# Patient Record
Sex: MALE | Race: WHITE | NOT HISPANIC OR LATINO | Employment: OTHER | URBAN - METROPOLITAN AREA
[De-identification: names, ages, dates, MRNs, and addresses within clinical notes are randomized per-mention and may not be internally consistent; named-entity substitution may affect disease eponyms.]

---

## 2018-01-22 ENCOUNTER — OFFICE VISIT (OUTPATIENT)
Dept: MEDICAL GROUP | Facility: MEDICAL CENTER | Age: 63
End: 2018-01-22
Payer: COMMERCIAL

## 2018-01-22 VITALS
RESPIRATION RATE: 16 BRPM | HEIGHT: 69 IN | SYSTOLIC BLOOD PRESSURE: 118 MMHG | WEIGHT: 170.6 LBS | TEMPERATURE: 98 F | HEART RATE: 70 BPM | BODY MASS INDEX: 25.27 KG/M2 | DIASTOLIC BLOOD PRESSURE: 84 MMHG | OXYGEN SATURATION: 96 %

## 2018-01-22 DIAGNOSIS — Z00.00 HEALTHCARE MAINTENANCE: ICD-10-CM

## 2018-01-22 DIAGNOSIS — Z85.51 HISTORY OF BLADDER CANCER: ICD-10-CM

## 2018-01-22 DIAGNOSIS — E78.5 DYSLIPIDEMIA: ICD-10-CM

## 2018-01-22 DIAGNOSIS — Z23 NEED FOR VACCINATION: ICD-10-CM

## 2018-01-22 DIAGNOSIS — Z12.11 COLON CANCER SCREENING: ICD-10-CM

## 2018-01-22 PROCEDURE — 99202 OFFICE O/P NEW SF 15 MIN: CPT | Mod: 25 | Performed by: FAMILY MEDICINE

## 2018-01-22 PROCEDURE — 90686 IIV4 VACC NO PRSV 0.5 ML IM: CPT | Performed by: FAMILY MEDICINE

## 2018-01-22 PROCEDURE — 90471 IMMUNIZATION ADMIN: CPT | Performed by: FAMILY MEDICINE

## 2018-01-22 RX ORDER — ATORVASTATIN CALCIUM 10 MG/1
10 TABLET, FILM COATED ORAL DAILY
COMMUNITY
Start: 2017-11-17 | End: 2018-04-23 | Stop reason: SDUPTHER

## 2018-01-22 ASSESSMENT — PATIENT HEALTH QUESTIONNAIRE - PHQ9: CLINICAL INTERPRETATION OF PHQ2 SCORE: 0

## 2018-01-22 ASSESSMENT — PAIN SCALES - GENERAL: PAINLEVEL: NO PAIN

## 2018-01-22 NOTE — ASSESSMENT & PLAN NOTE
Lipids: Records requested  Fasting Glucose: Records requested  Hepatitis C Screen: Records requested  Colonoscopy: Ordered  PHQ2: done 01/22/18 and normal.    Flu vaccine: Ordered  Tdap: Patient states this was given within the past 10 years.  Zoster vaccine: Patient states this was given.

## 2018-01-22 NOTE — PROGRESS NOTES
"[facility]  Progress Note  New Patient    Subjective:   Jareth Sunshine is a 62 y.o. male here today with a chief complaint of high cholesterol. This is a new patient to me. The patient comes in alone.     Healthcare maintenance  Lipids: Records requested  Fasting Glucose: Records requested  Hepatitis C Screen: Records requested  Colonoscopy: Ordered  PHQ2: done 01/22/18 and normal.    Flu vaccine: Ordered  Tdap: Patient states this was given within the past 10 years.  Zoster vaccine: Patient states this was given.      History of bladder cancer  The patient has a history of bladder cancer status post laser surgery and local chemotherapy in 2011. He has an appointment with a local urologist Dr. Murphy within the next 2 weeks.    Dyslipidemia  The patient takes atorvastatin 10 mg daily for his cholesterol. He tolerates this medication well.      No current outpatient prescriptions on file prior to visit.     No current facility-administered medications on file prior to visit.        Past Medical History:   Diagnosis Date   • Cancer (CMS-HCC)     Bladder, s/p treatment   • Hyperlipidemia        Allergies: Patient has no known allergies.    Surgical History:  has a past surgical history that includes other.    Family History: family history includes Arthritis in his mother; Diabetes in his sister; No Known Problems in his father.    Social History:  reports that he has never smoked. He has never used smokeless tobacco. He reports that he does not drink alcohol or use drugs.    ROS:   No CP or SOB.     Objective:     Vitals:    01/22/18 0810   BP: 118/84   Pulse: 70   Resp: 16   Temp: 36.7 °C (98 °F)   SpO2: 96%   Weight: 77.4 kg (170 lb 9.6 oz)   Height: 1.753 m (5' 9\")       Physical Exam:  Constitutional: Alert, no distress.  Skin: Warm, dry, good turgor, no rashes in visible areas.  Respiratory: Unlabored respiratory effort, lungs clear to auscultation, no wheezes, no ronchi.  Cardiovascular: Normal S1, " S2, no murmur, no edema.  Psych: normal affect and mood.        Assessment and Plan:     1. Healthcare maintenance  - see HPI.     2. History of bladder cancer  - f/u urology.     3. Dyslipidemia  - continue atorvastatin 10 mg daily.     4. Colon cancer screening  - REFERRAL TO GASTROENTEROLOGY    5. Need for vaccination  - INFLUENZA VACCINE QUAD INJ >3Y(PF)        Followup: Return in about 3 months (around 4/22/2018), or if symptoms worsen or fail to improve.

## 2018-01-22 NOTE — ASSESSMENT & PLAN NOTE
The patient takes atorvastatin 10 mg daily for his cholesterol. He tolerates this medication well.

## 2018-01-22 NOTE — ASSESSMENT & PLAN NOTE
The patient has a history of bladder cancer status post laser surgery and local chemotherapy in 2011. He has an appointment with a local urologist Dr. Murphy within the next 2 weeks.

## 2018-01-22 NOTE — LETTER
[facility]  Guero Diaz M.D.  [address]  [facility] [address]  Fax: [phone]   Authorization for Release/Disclosure of   Protected Health Information   Name: JARETH SUNSHINE : 1955 SSN: [id number]   Address:  [facility] [address] Phone:    [phone] (home)    I authorize the entity listed below to release/disclose the PHI below to:   [facility]/Guero Diaz M.D. and Guero Diaz M.D.   Provider or Entity Name:  Dr. Oliverio Rich    Address   [place], [place] Phone:      Fax:     Reason for request: continuity of care   Information to be released:    [  ] LAST COLONOSCOPY,  including any PATH REPORT and follow-up  [  ] LAST FIT/COLOGUARD RESULT [  ] LAST DEXA  [  ] LAST MAMMOGRAM  [  ] LAST PAP  [  ] LAST LABS [  ] RETINA EXAM REPORT  [  ] IMMUNIZATION RECORDS  [X  ] Release all info      [  ] Check here and initial the line next to each item to release ALL health information INCLUDING  _____ Care and treatment for drug and / or alcohol abuse  _____ HIV testing, infection status, or AIDS  _____ Genetic Testing    DATES OF SERVICE OR TIME PERIOD TO BE DISCLOSED: _____________  I understand and acknowledge that:  * This Authorization may be revoked at any time by you in writing, except if your health information has already been used or disclosed.  * Your health information that will be used or disclosed as a result of you signing this authorization could be re-disclosed by the recipient. If this occurs, your re-disclosed health information may no longer be protected by State or Federal laws.  * You may refuse to sign this Authorization. Your refusal will not affect your ability to obtain treatment.  * This Authorization becomes effective upon signing and will  on (date) __________.      If no date is indicated, this Authorization will  one (1) year from the signature date.    Name: Jareth Sunshine    Signature:   Date:     1/22/2018       PLEASE FAX REQUESTED RECORDS BACK TO: [phone]

## 2018-04-16 ENCOUNTER — TELEPHONE (OUTPATIENT)
Dept: MEDICAL GROUP | Facility: MEDICAL CENTER | Age: 63
End: 2018-04-16

## 2018-04-16 DIAGNOSIS — Z13.6 SCREENING FOR ISCHEMIC HEART DISEASE: ICD-10-CM

## 2018-04-16 DIAGNOSIS — Z13.1 SCREENING FOR DIABETES MELLITUS: ICD-10-CM

## 2018-04-16 DIAGNOSIS — Z72.89 OTHER PROBLEMS RELATED TO LIFESTYLE: ICD-10-CM

## 2018-04-16 NOTE — TELEPHONE ENCOUNTER
----- Message from Neeru Elizalde sent at 4/16/2018 11:36 AM PDT -----  Regarding: Lab Request  Patient called to request labs to be ordered

## 2018-04-23 ENCOUNTER — OFFICE VISIT (OUTPATIENT)
Dept: MEDICAL GROUP | Facility: MEDICAL CENTER | Age: 63
End: 2018-04-23
Payer: COMMERCIAL

## 2018-04-23 VITALS
OXYGEN SATURATION: 97 % | HEART RATE: 82 BPM | WEIGHT: 176.48 LBS | BODY MASS INDEX: 26.14 KG/M2 | HEIGHT: 69 IN | TEMPERATURE: 97.8 F | SYSTOLIC BLOOD PRESSURE: 110 MMHG | DIASTOLIC BLOOD PRESSURE: 66 MMHG

## 2018-04-23 DIAGNOSIS — E78.5 DYSLIPIDEMIA: ICD-10-CM

## 2018-04-23 DIAGNOSIS — Z13.6 SCREENING FOR ISCHEMIC HEART DISEASE: ICD-10-CM

## 2018-04-23 DIAGNOSIS — Z23 NEED FOR VACCINATION: ICD-10-CM

## 2018-04-23 DIAGNOSIS — Z12.5 SCREENING FOR PROSTATE CANCER: ICD-10-CM

## 2018-04-23 DIAGNOSIS — Z72.89 OTHER PROBLEMS RELATED TO LIFESTYLE: ICD-10-CM

## 2018-04-23 DIAGNOSIS — C67.9 MALIGNANT NEOPLASM OF URINARY BLADDER, UNSPECIFIED SITE (HCC): ICD-10-CM

## 2018-04-23 DIAGNOSIS — Z13.1 SCREENING FOR DIABETES MELLITUS: ICD-10-CM

## 2018-04-23 DIAGNOSIS — Z00.00 HEALTHCARE MAINTENANCE: ICD-10-CM

## 2018-04-23 PROCEDURE — 90670 PCV13 VACCINE IM: CPT | Performed by: FAMILY MEDICINE

## 2018-04-23 PROCEDURE — 99213 OFFICE O/P EST LOW 20 MIN: CPT | Mod: 25 | Performed by: FAMILY MEDICINE

## 2018-04-23 PROCEDURE — 90472 IMMUNIZATION ADMIN EACH ADD: CPT | Performed by: FAMILY MEDICINE

## 2018-04-23 PROCEDURE — 90715 TDAP VACCINE 7 YRS/> IM: CPT | Performed by: FAMILY MEDICINE

## 2018-04-23 PROCEDURE — 90471 IMMUNIZATION ADMIN: CPT | Performed by: FAMILY MEDICINE

## 2018-04-23 RX ORDER — ATORVASTATIN CALCIUM 10 MG/1
10 TABLET, FILM COATED ORAL DAILY
Qty: 90 TAB | Refills: 3 | Status: SHIPPED | OUTPATIENT
Start: 2018-04-23 | End: 2019-04-13 | Stop reason: SDUPTHER

## 2018-04-23 NOTE — ASSESSMENT & PLAN NOTE
The patient takes atorvastatin 10 mg daily for his cholesterol. He tolerates this medication well. 2015 lipid panel was normal. I don't see LFT's.

## 2018-04-23 NOTE — ASSESSMENT & PLAN NOTE
The patient has bladder cancer status post laser surgery and local chemotherapy in 2011. He has an appointment with a local urologist Dr. Murphy and is requesting a PSA before this appointment.

## 2018-04-23 NOTE — ASSESSMENT & PLAN NOTE
Lipids: ordered.  Fasting Glucose: ordered.  Hepatitis C Screen: ordered.  Colonoscopy: appointment upcoming.  PHQ2: done 01/22/18 and normal.    Tdap: given 04/23/18.  Pneumonia Vaccines: Prevnar given 04/23/18.  Shingrix: patient will return for this.

## 2018-04-23 NOTE — PROGRESS NOTES
"[facility]  Progress Note  Established Patient    Subjective:   Jareth Sunshine is a 63 y.o. male here today with a chief complaint of DLD. The patient is alone.     Dyslipidemia  The patient takes atorvastatin 10 mg daily for his cholesterol. He tolerates this medication well. 2015 lipid panel was normal. I don't see LFT's.     Healthcare maintenance  Lipids: ordered.  Fasting Glucose: ordered.  Hepatitis C Screen: ordered.  Colonoscopy: appointment upcoming.  PHQ2: done 01/22/18 and normal.    Tdap: given 04/23/18.  Pneumonia Vaccines: Prevnar given 04/23/18.  Shingrix: patient will return for this.    Bladder cancer (CMS-HCC)  The patient has bladder cancer status post laser surgery and local chemotherapy in 2011. He has an appointment with a local urologist Dr. Murphy and is requesting a PSA before this appointment.      No current outpatient prescriptions on file prior to visit.     No current facility-administered medications on file prior to visit.        Past Medical History:   Diagnosis Date   • Cancer (CMS-[facility])     Bladder, s/p treatment   • Hyperlipidemia        Allergies: Patient has no known allergies.    Surgical History:  has a past surgical history that includes other.    Family History: family history includes Arthritis in his mother; Diabetes in his sister; No Known Problems in his father.    Social History:  reports that he has never smoked. He has never used smokeless tobacco. He reports that he does not drink alcohol or use drugs.    ROS: no CP or SOB.        Objective:     Vitals:    04/23/18 0901   BP: 110/66   Pulse: 82   Temp: 36.6 °C (97.8 °F)   SpO2: 97%   Weight: 80 kg (176 lb 7.7 oz)   Height: 1.753 m (5' 9\")       Physical Exam:  General: alert in no apparent distress.   Cardio: regular rate and rhythm, no murmurs, rubs or gallops.   Resp: CTAB no w/r/r.         Assessment and Plan:     1. Need for vaccination  - TDAP VACCINE =>8YO IM  - Pneumococcal Conjugate Vaccine " 13-Valent <4yo IM    2. Screening for ischemic heart disease  - LIPID PROFILE; Future    3. Screening for diabetes mellitus  - COMP METABOLIC PANEL; Future    4. Other problems related to lifestyle  - HEP C VIRUS ANTIBODY; Future    5. Screening for prostate cancer  - PROSTATE SPECIFIC AG SCREENING; Future    6. Healthcare maintenance  - see HPI.     7. Dyslipidemia  This is an established and stable problem.  - atorvastatin (LIPITOR) 10 MG Tab; Take 1 Tab by mouth every day.  Dispense: 90 Tab; Refill: 3    8. Malignant neoplasm of urinary bladder, unspecified site (CMS-HCC)  This is an established and stable problem.  - f/u urology.         Followup: Return in about 9 months (around 1/23/2019), or if symptoms worsen or fail to improve, for Wellness Visit, Long.

## 2018-05-07 ENCOUNTER — HOSPITAL ENCOUNTER (OUTPATIENT)
Dept: LAB | Facility: MEDICAL CENTER | Age: 63
End: 2018-05-07
Attending: FAMILY MEDICINE
Payer: COMMERCIAL

## 2018-05-07 DIAGNOSIS — Z72.89 OTHER PROBLEMS RELATED TO LIFESTYLE: ICD-10-CM

## 2018-05-07 DIAGNOSIS — Z13.6 SCREENING FOR ISCHEMIC HEART DISEASE: ICD-10-CM

## 2018-05-07 DIAGNOSIS — Z13.1 SCREENING FOR DIABETES MELLITUS: ICD-10-CM

## 2018-05-07 DIAGNOSIS — Z12.5 SCREENING FOR PROSTATE CANCER: ICD-10-CM

## 2018-05-07 LAB
ALBUMIN SERPL BCP-MCNC: 3.7 G/DL (ref 3.2–4.9)
ALBUMIN/GLOB SERPL: 1.2 G/DL
ALP SERPL-CCNC: 54 U/L (ref 30–99)
ALT SERPL-CCNC: 15 U/L (ref 2–50)
ANION GAP SERPL CALC-SCNC: 8 MMOL/L (ref 0–11.9)
AST SERPL-CCNC: 17 U/L (ref 12–45)
BILIRUB SERPL-MCNC: 1.3 MG/DL (ref 0.1–1.5)
BUN SERPL-MCNC: 11 MG/DL (ref 8–22)
CALCIUM SERPL-MCNC: 9.2 MG/DL (ref 8.5–10.5)
CHLORIDE SERPL-SCNC: 105 MMOL/L (ref 96–112)
CHOLEST SERPL-MCNC: 130 MG/DL (ref 100–199)
CO2 SERPL-SCNC: 28 MMOL/L (ref 20–33)
CREAT SERPL-MCNC: 0.99 MG/DL (ref 0.5–1.4)
GLOBULIN SER CALC-MCNC: 3.1 G/DL (ref 1.9–3.5)
GLUCOSE SERPL-MCNC: 93 MG/DL (ref 65–99)
HCV AB SER QL: NEGATIVE
HDLC SERPL-MCNC: 52 MG/DL
LDLC SERPL CALC-MCNC: 59 MG/DL
POTASSIUM SERPL-SCNC: 4.6 MMOL/L (ref 3.6–5.5)
PROT SERPL-MCNC: 6.8 G/DL (ref 6–8.2)
PSA SERPL-MCNC: 0.54 NG/ML (ref 0–4)
SODIUM SERPL-SCNC: 141 MMOL/L (ref 135–145)
TRIGL SERPL-MCNC: 93 MG/DL (ref 0–149)

## 2018-05-07 PROCEDURE — 86803 HEPATITIS C AB TEST: CPT

## 2018-05-07 PROCEDURE — 80061 LIPID PANEL: CPT

## 2018-05-07 PROCEDURE — 80053 COMPREHEN METABOLIC PANEL: CPT

## 2018-05-07 PROCEDURE — 84153 ASSAY OF PSA TOTAL: CPT

## 2018-05-07 PROCEDURE — 36415 COLL VENOUS BLD VENIPUNCTURE: CPT

## 2018-07-10 ENCOUNTER — HOSPITAL ENCOUNTER (EMERGENCY)
Facility: MEDICAL CENTER | Age: 63
End: 2018-07-10
Attending: EMERGENCY MEDICINE
Payer: COMMERCIAL

## 2018-07-10 VITALS
TEMPERATURE: 97.5 F | HEART RATE: 77 BPM | OXYGEN SATURATION: 96 % | HEIGHT: 69 IN | SYSTOLIC BLOOD PRESSURE: 134 MMHG | RESPIRATION RATE: 16 BRPM | BODY MASS INDEX: 26.06 KG/M2 | WEIGHT: 175.93 LBS | DIASTOLIC BLOOD PRESSURE: 104 MMHG

## 2018-07-10 DIAGNOSIS — H10.9 CONJUNCTIVITIS OF RIGHT EYE, UNSPECIFIED CONJUNCTIVITIS TYPE: ICD-10-CM

## 2018-07-10 DIAGNOSIS — H57.8A9 SENSATION OF FOREIGN BODY IN EYE: ICD-10-CM

## 2018-07-10 PROCEDURE — 99284 EMERGENCY DEPT VISIT MOD MDM: CPT

## 2018-07-10 RX ORDER — POLYMYXIN B SULFATE AND TRIMETHOPRIM 1; 10000 MG/ML; [USP'U]/ML
2 SOLUTION OPHTHALMIC EVERY 4 HOURS
Qty: 1 BOTTLE | Refills: 0 | Status: SHIPPED | OUTPATIENT
Start: 2018-07-10 | End: 2019-03-14

## 2018-07-10 RX ORDER — PROPARACAINE HYDROCHLORIDE 5 MG/ML
SOLUTION/ DROPS OPHTHALMIC
Status: DISCONTINUED
Start: 2018-07-10 | End: 2018-07-11 | Stop reason: HOSPADM

## 2018-07-10 ASSESSMENT — PAIN SCALES - GENERAL: PAINLEVEL_OUTOF10: 3

## 2018-07-11 ENCOUNTER — PATIENT OUTREACH (OUTPATIENT)
Dept: HEALTH INFORMATION MANAGEMENT | Facility: OTHER | Age: 63
End: 2018-07-11

## 2018-07-11 NOTE — DISCHARGE INSTRUCTIONS
Allergic Conjunctivitis  A clear membrane (conjunctiva) covers the white part of your eye and the inner surface of your eyelid. Allergic conjunctivitis happens when this membrane has inflammation. This is caused by allergies. Common causes of allergic reactions (allergens)include:  · Outdoor allergens, such as:  ¨ Pollen.  ¨ Grass and weeds.  ¨ Mold spores.  · Indoor allergens, such as:  ¨ Dust.  ¨ Smoke.  ¨ Mold.  ¨ Pet dander.  ¨ Animal hair.  This condition can make your eye red or pink. It can also make your eye feel itchy. This condition cannot be spread from one person to another person (is not contagious).  Follow these instructions at home:  · Try not to be around things that you are allergic to.  · Take or apply over-the-counter and prescription medicines only as told by your doctor. These include any eye drops.  · Place a cool, clean washcloth on your eye for 10-20 minutes. Do this 3-4 times a day.  · Do not touch or rub your eyes.  · Do not wear contact lenses until the inflammation is gone. Wear glasses instead.  · Do not wear eye makeup until the inflammation is gone.  · Keep all follow-up visits as told by your doctor. This is important.  Contact a doctor if:  · Your symptoms get worse.  · Your symptoms do not get better with treatment.  · You have mild eye pain.  · You are sensitive to light,  · You have spots or blisters on your eyes.  · You have pus coming from your eye.  · You have a fever.  Get help right away if:  · You have redness, swelling, or other symptoms in only one eye.  · Your vision is blurry.  · You have vision changes.  · You have very bad eye pain.  Summary  · Allergic conjunctivitis is caused by allergies. It can make your eye red or pink, and it can make your eye feel itchy.  · This condition cannot be spread from one person to another person (is not contagious).  · Try not to be around things that you are allergic to.  · Take or apply over-the-counter and prescription medicines  only as told by your doctor. These include any eye drops.  · Contact your doctor if your symptoms get worse or they do not get better with treatment.  This information is not intended to replace advice given to you by your health care provider. Make sure you discuss any questions you have with your health care provider.  Document Released: 06/07/2011 Document Revised: 08/11/2017 Document Reviewed: 08/11/2017  [employer] Interactive Patient Education © 2017 [employer]  Return if fever, vomiting or if no better in 12 hours.

## 2018-07-11 NOTE — ED PROVIDER NOTES
"ED Provider Note    CHIEF COMPLAINT  Chief Complaint   Patient presents with   • Foreign Body in Eye     Foreign body to right eye when golfing yesterday         HPI  Jareth Sunshine is a 63 y.o. male who presents with history of playing golf yesterday about 2 PM, he felt a foreign body sensation on his right. No other injuries    REVIEW OF SYSTEMS  See HPI for further details.     PAST MEDICAL HISTORY  Past Medical History:   Diagnosis Date   • Cancer (HCC)     Bladder, s/p treatment   • Hyperlipidemia          SOCIAL HISTORY        CURRENT MEDICATIONS  Home Medications    **Home medications have not yet been reviewed for this encounter**         ALLERGIES  No Known Allergies    PHYSICAL EXAM  VITAL SIGNS: /87   Pulse (!) 56   Temp 36.7 °C (98.1 °F)   Resp 16   Ht 1.753 m (5' 9\")   Wt 79.8 kg (175 lb 14.8 oz)   BMI 25.98 kg/m²   Constitutional: Well developed, Well nourished, No acute distress, Non-toxic appearance.   HENT: Normocephalic, Atraumatic, Bilateral external ears normal, Oropharynx moist, No oral exudates, Nose normal.   Eyes: PERRLA, EOMI, Conjunctiva on the left, conjunctiva injected on the right, nasal, superior cornea appears normal anterior chamber, pupil normal lids are normal  Neck: Normal range of motion, No tenderness, Supple, No stridor.   Cardiovascular:  Heart sounds are normal no murmurs or rubs  Thorax & Lungs: Lungs are clear equal breath hands bilaterally no rhonchi rales or wheezes. Chest wall motion is nonlabored  Abdomen: Bowel sounds normal, Soft, No tenderness, No masses, No pulsatile masses.   Skin: Warm, Dry, No erythema, No rash.   Neurologic: Alert & oriented x 3, Normal motor function, Normal sensory function, No focal deficits noted.         COURSE & MEDICAL DECISION MAKING  Pertinent Labs & Imaging studies reviewed. (See chart for details)    . I am not able to find any evidence of foreign body. Lid is everted. Slit-lamp date is no evidence of corneal uptake or " foreign body. He has good relief with topical analgesics. I am seeing no evidence of glaucoma. It I will have him follow up the on-call ophthalmologist, Dr. Ortez think he probably has a conjunctivitis. He is to irrigate right eye, every 2 hours to be followed by Polytrim drops  FINAL IMPRESSION  1.  1. Sensation of foreign body in eye      1. Sensation of foreign body in eye    2. Conjunctivitis of right eye, unspecified conjunctivitis type          2. Conjunctivitis right eye  3.    Disposition:  Discharge instructions are understood. This patient is to return if fever vomiting or no better in 12 hours. Follow up with the on-call ophthalmologist Electronically signed by: Akin Rivas, 7/10/2018 9:11 PM

## 2019-03-14 ENCOUNTER — OFFICE VISIT (OUTPATIENT)
Dept: MEDICAL GROUP | Facility: MEDICAL CENTER | Age: 64
End: 2019-03-14
Payer: COMMERCIAL

## 2019-03-14 VITALS
WEIGHT: 169 LBS | OXYGEN SATURATION: 98 % | RESPIRATION RATE: 16 BRPM | HEART RATE: 69 BPM | BODY MASS INDEX: 25.03 KG/M2 | DIASTOLIC BLOOD PRESSURE: 64 MMHG | TEMPERATURE: 97.2 F | HEIGHT: 69 IN | SYSTOLIC BLOOD PRESSURE: 104 MMHG

## 2019-03-14 DIAGNOSIS — Z12.5 SCREENING FOR PROSTATE CANCER: ICD-10-CM

## 2019-03-14 DIAGNOSIS — C67.9 MALIGNANT NEOPLASM OF URINARY BLADDER, UNSPECIFIED SITE (HCC): ICD-10-CM

## 2019-03-14 DIAGNOSIS — Z00.00 HEALTHCARE MAINTENANCE: ICD-10-CM

## 2019-03-14 DIAGNOSIS — E78.5 DYSLIPIDEMIA: ICD-10-CM

## 2019-03-14 PROCEDURE — 99397 PER PM REEVAL EST PAT 65+ YR: CPT | Performed by: FAMILY MEDICINE

## 2019-03-14 NOTE — ASSESSMENT & PLAN NOTE
Lipids: ordered.  Fasting Glucose: done 2018 and normal.   Hepatitis C Screen: done 2018 and normal.   Colonoscopy: records requested.     Tdap: given 04/23/18.  Pneumonia Vaccines: Prevnar given 04/23/18.  Shingrix: recommended, patient will return for this.

## 2019-03-14 NOTE — ASSESSMENT & PLAN NOTE
The patient has bladder cancer status post laser surgery and local chemotherapy in 2011. He follows with Dr. Murphy (urology).

## 2019-03-14 NOTE — PROGRESS NOTES
"[facility]  Progress Note  Established Patient    Subjective:   Jareth Sunshine is a 63 y.o. male here today for a wellness exam. The patient is alone.     Healthcare maintenance  Lipids: ordered.  Fasting Glucose: done 2018 and normal.   Hepatitis C Screen: done 2018 and normal.   Colonoscopy: records requested.     Tdap: given 04/23/18.  Pneumonia Vaccines: Prevnar given 04/23/18.  Shingrix: recommended, patient will return for this.     Bladder cancer (HCC)  The patient has bladder cancer status post laser surgery and local chemotherapy in 2011. He follows with Dr. Murphy (urology).     Dyslipidemia  The patients cholesterol is under control on atorvastatin.       Current Outpatient Prescriptions on File Prior to Visit   Medication Sig Dispense Refill   • atorvastatin (LIPITOR) 10 MG Tab Take 1 Tab by mouth every day. 90 Tab 3     No current facility-administered medications on file prior to visit.        Past Medical History:   Diagnosis Date   • Cancer (HCC)     Bladder, s/p treatment   • Hyperlipidemia        Allergies: Patient has no known allergies.    Surgical History:  has a past surgical history that includes other.    Family History: family history includes Arthritis in his mother; Diabetes in his sister; No Known Problems in his father.    Social History:  reports that he has never smoked. He has never used smokeless tobacco. He reports that he drinks alcohol. He reports that he does not use drugs.    ROS: no CP or SOB.        Objective:     Vitals:    03/14/19 1302   BP: 104/64   BP Location: Left arm   Patient Position: Sitting   BP Cuff Size: Adult   Pulse: 69   Resp: 16   Temp: 36.2 °C (97.2 °F)   TempSrc: Temporal   SpO2: 98%   Weight: 76.7 kg (169 lb)   Height: 1.753 m (5' 9\")       Physical Exam:  General: alert in no apparent distress.   Cardio: regular rate and rhythm, no murmurs, rubs or gallops.   Resp: CTAB no w/r/r.         Assessment and Plan:     1. Dyslipidemia  - continue " statin.   - Comp Metabolic Panel; Future    2. Malignant neoplasm of urinary bladder, unspecified site (HCC)  - f/u urology.     3. Screening for prostate cancer  - PROSTATE SPECIFIC AG SCREENING; Future    4. Healthcare maintenance  - see HPI.   - discussed diet and exercise, Mediterranean Diet handout given.         Followup: Return in about 1 year (around 3/14/2020), or if symptoms worsen or fail to improve, for Wellness Visit, Long.

## 2019-05-22 ENCOUNTER — HOSPITAL ENCOUNTER (OUTPATIENT)
Dept: LAB | Facility: MEDICAL CENTER | Age: 64
End: 2019-05-22
Attending: UROLOGY
Payer: COMMERCIAL

## 2019-05-22 PROCEDURE — 87086 URINE CULTURE/COLONY COUNT: CPT

## 2019-05-25 LAB
BACTERIA UR CULT: NORMAL
SIGNIFICANT IND 70042: NORMAL
SITE SITE: NORMAL
SOURCE SOURCE: NORMAL

## 2019-05-29 DIAGNOSIS — Z01.810 PRE-OPERATIVE CARDIOVASCULAR EXAMINATION: ICD-10-CM

## 2019-05-29 DIAGNOSIS — Z01.812 PRE-OPERATIVE LABORATORY EXAMINATION: ICD-10-CM

## 2019-05-29 LAB
ANION GAP SERPL CALC-SCNC: 7 MMOL/L (ref 0–11.9)
BASOPHILS # BLD AUTO: 1.2 % (ref 0–1.8)
BASOPHILS # BLD: 0.05 K/UL (ref 0–0.12)
BUN SERPL-MCNC: 12 MG/DL (ref 8–22)
CALCIUM SERPL-MCNC: 9 MG/DL (ref 8.5–10.5)
CHLORIDE SERPL-SCNC: 109 MMOL/L (ref 96–112)
CO2 SERPL-SCNC: 25 MMOL/L (ref 20–33)
CREAT SERPL-MCNC: 1.05 MG/DL (ref 0.5–1.4)
EKG IMPRESSION: NORMAL
EOSINOPHIL # BLD AUTO: 0.05 K/UL (ref 0–0.51)
EOSINOPHIL NFR BLD: 1.2 % (ref 0–6.9)
ERYTHROCYTE [DISTWIDTH] IN BLOOD BY AUTOMATED COUNT: 44.2 FL (ref 35.9–50)
GLUCOSE SERPL-MCNC: 102 MG/DL (ref 65–99)
HCT VFR BLD AUTO: 47.6 % (ref 42–52)
HGB BLD-MCNC: 15.5 G/DL (ref 14–18)
IMM GRANULOCYTES # BLD AUTO: 0.01 K/UL (ref 0–0.11)
IMM GRANULOCYTES NFR BLD AUTO: 0.2 % (ref 0–0.9)
LYMPHOCYTES # BLD AUTO: 1.01 K/UL (ref 1–4.8)
LYMPHOCYTES NFR BLD: 23.3 % (ref 22–41)
MCH RBC QN AUTO: 30.8 PG (ref 27–33)
MCHC RBC AUTO-ENTMCNC: 32.6 G/DL (ref 33.7–35.3)
MCV RBC AUTO: 94.6 FL (ref 81.4–97.8)
MONOCYTES # BLD AUTO: 0.46 K/UL (ref 0–0.85)
MONOCYTES NFR BLD AUTO: 10.6 % (ref 0–13.4)
NEUTROPHILS # BLD AUTO: 2.75 K/UL (ref 1.82–7.42)
NEUTROPHILS NFR BLD: 63.5 % (ref 44–72)
NRBC # BLD AUTO: 0 K/UL
NRBC BLD-RTO: 0 /100 WBC
PLATELET # BLD AUTO: 233 K/UL (ref 164–446)
PMV BLD AUTO: 9.3 FL (ref 9–12.9)
POTASSIUM SERPL-SCNC: 4.3 MMOL/L (ref 3.6–5.5)
RBC # BLD AUTO: 5.03 M/UL (ref 4.7–6.1)
SODIUM SERPL-SCNC: 141 MMOL/L (ref 135–145)
WBC # BLD AUTO: 4.3 K/UL (ref 4.8–10.8)

## 2019-05-29 PROCEDURE — 85025 COMPLETE CBC W/AUTO DIFF WBC: CPT

## 2019-05-29 PROCEDURE — 93005 ELECTROCARDIOGRAM TRACING: CPT

## 2019-05-29 PROCEDURE — 36415 COLL VENOUS BLD VENIPUNCTURE: CPT

## 2019-05-29 PROCEDURE — 80048 BASIC METABOLIC PNL TOTAL CA: CPT

## 2019-05-29 PROCEDURE — 93010 ELECTROCARDIOGRAM REPORT: CPT | Performed by: INTERNAL MEDICINE

## 2019-06-05 ENCOUNTER — ANESTHESIA EVENT (OUTPATIENT)
Dept: SURGERY | Facility: MEDICAL CENTER | Age: 64
End: 2019-06-05
Payer: COMMERCIAL

## 2019-06-06 ENCOUNTER — HOSPITAL ENCOUNTER (OUTPATIENT)
Facility: MEDICAL CENTER | Age: 64
End: 2019-06-06
Attending: UROLOGY | Admitting: UROLOGY
Payer: COMMERCIAL

## 2019-06-06 ENCOUNTER — ANESTHESIA (OUTPATIENT)
Dept: SURGERY | Facility: MEDICAL CENTER | Age: 64
End: 2019-06-06
Payer: COMMERCIAL

## 2019-06-06 VITALS
WEIGHT: 162.7 LBS | DIASTOLIC BLOOD PRESSURE: 77 MMHG | TEMPERATURE: 97.2 F | OXYGEN SATURATION: 99 % | SYSTOLIC BLOOD PRESSURE: 120 MMHG | BODY MASS INDEX: 24.1 KG/M2 | HEIGHT: 69 IN | RESPIRATION RATE: 17 BRPM | HEART RATE: 68 BPM

## 2019-06-06 PROCEDURE — A9270 NON-COVERED ITEM OR SERVICE: HCPCS | Performed by: ANESTHESIOLOGY

## 2019-06-06 PROCEDURE — 160046 HCHG PACU - 1ST 60 MINS PHASE II: Performed by: UROLOGY

## 2019-06-06 PROCEDURE — 700105 HCHG RX REV CODE 258: Performed by: ANESTHESIOLOGY

## 2019-06-06 PROCEDURE — 160048 HCHG OR STATISTICAL LEVEL 1-5: Performed by: UROLOGY

## 2019-06-06 PROCEDURE — 88307 TISSUE EXAM BY PATHOLOGIST: CPT

## 2019-06-06 PROCEDURE — 160009 HCHG ANES TIME/MIN: Performed by: UROLOGY

## 2019-06-06 PROCEDURE — A9270 NON-COVERED ITEM OR SERVICE: HCPCS | Performed by: UROLOGY

## 2019-06-06 PROCEDURE — 700111 HCHG RX REV CODE 636 W/ 250 OVERRIDE (IP): Performed by: ANESTHESIOLOGY

## 2019-06-06 PROCEDURE — 700101 HCHG RX REV CODE 250: Performed by: ANESTHESIOLOGY

## 2019-06-06 PROCEDURE — 160035 HCHG PACU - 1ST 60 MINS PHASE I: Performed by: UROLOGY

## 2019-06-06 PROCEDURE — 160025 RECOVERY II MINUTES (STATS): Performed by: UROLOGY

## 2019-06-06 PROCEDURE — 500879 HCHG PACK, CYSTO: Performed by: UROLOGY

## 2019-06-06 PROCEDURE — 160036 HCHG PACU - EA ADDL 30 MINS PHASE I: Performed by: UROLOGY

## 2019-06-06 PROCEDURE — 700105 HCHG RX REV CODE 258: Performed by: UROLOGY

## 2019-06-06 PROCEDURE — 700102 HCHG RX REV CODE 250 W/ 637 OVERRIDE(OP): Performed by: UROLOGY

## 2019-06-06 PROCEDURE — 501329 HCHG SET, CYSTO IRRIG Y TUR: Performed by: UROLOGY

## 2019-06-06 PROCEDURE — 160028 HCHG SURGERY MINUTES - 1ST 30 MINS LEVEL 3: Performed by: UROLOGY

## 2019-06-06 PROCEDURE — 700102 HCHG RX REV CODE 250 W/ 637 OVERRIDE(OP): Performed by: ANESTHESIOLOGY

## 2019-06-06 PROCEDURE — 160002 HCHG RECOVERY MINUTES (STAT): Performed by: UROLOGY

## 2019-06-06 PROCEDURE — 160039 HCHG SURGERY MINUTES - EA ADDL 1 MIN LEVEL 3: Performed by: UROLOGY

## 2019-06-06 RX ORDER — ONDANSETRON 2 MG/ML
INJECTION INTRAMUSCULAR; INTRAVENOUS PRN
Status: DISCONTINUED | OUTPATIENT
Start: 2019-06-06 | End: 2019-06-06 | Stop reason: SURG

## 2019-06-06 RX ORDER — ONDANSETRON 2 MG/ML
4 INJECTION INTRAMUSCULAR; INTRAVENOUS
Status: DISCONTINUED | OUTPATIENT
Start: 2019-06-06 | End: 2019-06-06 | Stop reason: HOSPADM

## 2019-06-06 RX ORDER — SODIUM CHLORIDE, SODIUM LACTATE, POTASSIUM CHLORIDE, CALCIUM CHLORIDE 600; 310; 30; 20 MG/100ML; MG/100ML; MG/100ML; MG/100ML
INJECTION, SOLUTION INTRAVENOUS CONTINUOUS
Status: DISCONTINUED | OUTPATIENT
Start: 2019-06-06 | End: 2019-06-06 | Stop reason: HOSPADM

## 2019-06-06 RX ORDER — ATORVASTATIN CALCIUM 10 MG/1
10 TABLET, FILM COATED ORAL NIGHTLY
COMMUNITY
End: 2020-07-16

## 2019-06-06 RX ORDER — CEFAZOLIN SODIUM 1 G/3ML
INJECTION, POWDER, FOR SOLUTION INTRAMUSCULAR; INTRAVENOUS PRN
Status: DISCONTINUED | OUTPATIENT
Start: 2019-06-06 | End: 2019-06-06 | Stop reason: SURG

## 2019-06-06 RX ORDER — HALOPERIDOL 5 MG/ML
1 INJECTION INTRAMUSCULAR
Status: DISCONTINUED | OUTPATIENT
Start: 2019-06-06 | End: 2019-06-06 | Stop reason: HOSPADM

## 2019-06-06 RX ORDER — GABAPENTIN 300 MG/1
300 CAPSULE ORAL ONCE
Status: COMPLETED | OUTPATIENT
Start: 2019-06-06 | End: 2019-06-06

## 2019-06-06 RX ORDER — OXYCODONE HCL 5 MG/5 ML
10 SOLUTION, ORAL ORAL
Status: COMPLETED | OUTPATIENT
Start: 2019-06-06 | End: 2019-06-06

## 2019-06-06 RX ORDER — SODIUM CHLORIDE, SODIUM LACTATE, POTASSIUM CHLORIDE, CALCIUM CHLORIDE 600; 310; 30; 20 MG/100ML; MG/100ML; MG/100ML; MG/100ML
INJECTION, SOLUTION INTRAVENOUS
Status: DISCONTINUED | OUTPATIENT
Start: 2019-06-06 | End: 2019-06-06 | Stop reason: SURG

## 2019-06-06 RX ORDER — ACETAMINOPHEN 500 MG
1000 TABLET ORAL ONCE
Status: COMPLETED | OUTPATIENT
Start: 2019-06-06 | End: 2019-06-06

## 2019-06-06 RX ORDER — IPRATROPIUM BROMIDE AND ALBUTEROL SULFATE 2.5; .5 MG/3ML; MG/3ML
3 SOLUTION RESPIRATORY (INHALATION)
Status: DISCONTINUED | OUTPATIENT
Start: 2019-06-06 | End: 2019-06-06 | Stop reason: HOSPADM

## 2019-06-06 RX ORDER — MEPERIDINE HYDROCHLORIDE 25 MG/ML
6.25 INJECTION INTRAMUSCULAR; INTRAVENOUS; SUBCUTANEOUS
Status: DISCONTINUED | OUTPATIENT
Start: 2019-06-06 | End: 2019-06-06 | Stop reason: HOSPADM

## 2019-06-06 RX ORDER — HYDROMORPHONE HYDROCHLORIDE 1 MG/ML
0.1 INJECTION, SOLUTION INTRAMUSCULAR; INTRAVENOUS; SUBCUTANEOUS
Status: DISCONTINUED | OUTPATIENT
Start: 2019-06-06 | End: 2019-06-06 | Stop reason: HOSPADM

## 2019-06-06 RX ORDER — PHENAZOPYRIDINE HYDROCHLORIDE 200 MG/1
200 TABLET, FILM COATED ORAL 3 TIMES DAILY PRN
Qty: 15 TAB | Refills: 0 | Status: SHIPPED | OUTPATIENT
Start: 2019-06-06 | End: 2020-03-19

## 2019-06-06 RX ORDER — OXYCODONE HCL 5 MG/5 ML
5 SOLUTION, ORAL ORAL
Status: COMPLETED | OUTPATIENT
Start: 2019-06-06 | End: 2019-06-06

## 2019-06-06 RX ORDER — DEXAMETHASONE SODIUM PHOSPHATE 4 MG/ML
INJECTION, SOLUTION INTRA-ARTICULAR; INTRALESIONAL; INTRAMUSCULAR; INTRAVENOUS; SOFT TISSUE PRN
Status: DISCONTINUED | OUTPATIENT
Start: 2019-06-06 | End: 2019-06-06 | Stop reason: SURG

## 2019-06-06 RX ORDER — PHENAZOPYRIDINE HYDROCHLORIDE 100 MG/1
200 TABLET, FILM COATED ORAL
Status: COMPLETED | OUTPATIENT
Start: 2019-06-06 | End: 2019-06-06

## 2019-06-06 RX ORDER — HYDROMORPHONE HYDROCHLORIDE 1 MG/ML
0.4 INJECTION, SOLUTION INTRAMUSCULAR; INTRAVENOUS; SUBCUTANEOUS
Status: DISCONTINUED | OUTPATIENT
Start: 2019-06-06 | End: 2019-06-06 | Stop reason: HOSPADM

## 2019-06-06 RX ORDER — HYDROMORPHONE HYDROCHLORIDE 1 MG/ML
0.2 INJECTION, SOLUTION INTRAMUSCULAR; INTRAVENOUS; SUBCUTANEOUS
Status: DISCONTINUED | OUTPATIENT
Start: 2019-06-06 | End: 2019-06-06 | Stop reason: HOSPADM

## 2019-06-06 RX ADMIN — FENTANYL CITRATE 50 MCG: 50 INJECTION, SOLUTION INTRAMUSCULAR; INTRAVENOUS at 16:31

## 2019-06-06 RX ADMIN — CEFAZOLIN 2 G: 330 INJECTION, POWDER, FOR SOLUTION INTRAMUSCULAR; INTRAVENOUS at 16:42

## 2019-06-06 RX ADMIN — ATROPA BELLADONNA AND OPIUM 30 MG: 16.2; 3 SUPPOSITORY RECTAL at 17:56

## 2019-06-06 RX ADMIN — PROPOFOL 170 MG: 10 INJECTION, EMULSION INTRAVENOUS at 16:36

## 2019-06-06 RX ADMIN — DEXAMETHASONE SODIUM PHOSPHATE 8 MG: 4 INJECTION, SOLUTION INTRA-ARTICULAR; INTRALESIONAL; INTRAMUSCULAR; INTRAVENOUS; SOFT TISSUE at 16:43

## 2019-06-06 RX ADMIN — GABAPENTIN 300 MG: 300 CAPSULE ORAL at 15:12

## 2019-06-06 RX ADMIN — LIDOCAINE HYDROCHLORIDE 100 MG: 20 INJECTION, SOLUTION INTRAVENOUS at 16:36

## 2019-06-06 RX ADMIN — EPHEDRINE SULFATE 10 MG: 50 INJECTION INTRAMUSCULAR; INTRAVENOUS; SUBCUTANEOUS at 16:42

## 2019-06-06 RX ADMIN — SODIUM CHLORIDE, POTASSIUM CHLORIDE, SODIUM LACTATE AND CALCIUM CHLORIDE: 600; 310; 30; 20 INJECTION, SOLUTION INTRAVENOUS at 15:10

## 2019-06-06 RX ADMIN — ONDANSETRON 4 MG: 2 INJECTION INTRAMUSCULAR; INTRAVENOUS at 16:46

## 2019-06-06 RX ADMIN — EPHEDRINE SULFATE 5 MG: 50 INJECTION INTRAMUSCULAR; INTRAVENOUS; SUBCUTANEOUS at 16:39

## 2019-06-06 RX ADMIN — ACETAMINOPHEN 1000 MG: 500 TABLET ORAL at 15:13

## 2019-06-06 RX ADMIN — OXYCODONE HYDROCHLORIDE 5 MG: 5 SOLUTION ORAL at 17:39

## 2019-06-06 RX ADMIN — SODIUM CHLORIDE, POTASSIUM CHLORIDE, SODIUM LACTATE AND CALCIUM CHLORIDE: 600; 310; 30; 20 INJECTION, SOLUTION INTRAVENOUS at 16:30

## 2019-06-06 RX ADMIN — PHENAZOPYRIDINE HYDROCHLORIDE 200 MG: 100 TABLET ORAL at 17:39

## 2019-06-06 ASSESSMENT — PAIN SCALES - GENERAL: PAIN_LEVEL: 2

## 2019-06-06 NOTE — OR NURSING
Called OR and clarified UA order. Per Dr. Murphy, no need to collect a UA. Pre admit labs included a urine culture that resulted negative. Will discontinue order.

## 2019-06-06 NOTE — ANESTHESIA POSTPROCEDURE EVALUATION
Patient: Jareth Sunshine    Procedure Summary     Date:  06/06/19 Room / Location:  [place] [address] / SURGERY [facility]    Anesthesia Start:  1631 Anesthesia Stop:      Procedures:       CYSTOSCOPY      TRANS URETHRAL RESECTION BLADDER-  TUMOR Diagnosis:  (HISTORY OF BLADDER CANCER)    Surgeon:  Jim Murphy M.D. Responsible Provider:  Janel Kamara M.D.    Anesthesia Type:  general ASA Status:  2          Final Anesthesia Type: general  Last vitals  BP   Blood Pressure : 128/84, NIBP: (!) 95/55    Temp   36.1 °C (97 °F)    Pulse   Pulse: 61, Heart Rate (Monitored): 61   Resp   (!) 23    SpO2   99 %      Anesthesia Post Evaluation    Patient location during evaluation: PACU  Patient participation: complete - patient participated  Level of consciousness: awake and alert  Pain score: 2    Airway patency: patent  Anesthetic complications: no  Cardiovascular status: hemodynamically stable  Respiratory status: acceptable  Hydration status: euvolemic    PONV: none

## 2019-06-06 NOTE — ANESTHESIA PROCEDURE NOTES
Airway  Date/Time: 6/6/2019 4:37 PM  Performed by: JAMES ROMAN  Authorized by: JAMES ROMAN     Location:  OR  Urgency:  Elective  Difficult Airway: No    Indications for Airway Management:  Anesthesia  Spontaneous Ventilation: absent    Sedation Level:  Deep  Preoxygenated: Yes    Mask Difficulty Assessment:  1 - vent by mask  Final Airway Type:  Supraglottic airway  Final Supraglottic Airway:  Standard LMA  SGA Size:  4  Number of Attempts at Approach:  1

## 2019-06-06 NOTE — ANESTHESIA PREPROCEDURE EVALUATION
Relevant Problems   (+) Bladder cancer (HCC)       Physical Exam    Airway   Mallampati: II  TM distance: >3 FB  Neck ROM: full       Cardiovascular - normal exam  Rhythm: regular  Rate: normal  (-) murmur     Dental - normal exam         Pulmonary - normal exam  Breath sounds clear to auscultation     Abdominal    Neurological - normal exam                 Anesthesia Plan    ASA 2       Plan - general       Airway plan will be LMA        Induction: intravenous    Postoperative Plan: Postoperative administration of opioids is intended.    Pertinent diagnostic labs and testing reviewed    Informed Consent:    Anesthetic plan and risks discussed with patient.    Use of blood products discussed with: patient whom consented to blood products.

## 2019-06-07 LAB — PATHOLOGY CONSULT NOTE: NORMAL

## 2019-06-07 NOTE — OP REPORT
DATE OF SERVICE:  06/06/2019    PREOPERATIVE DIAGNOSIS:  Recurrent bladder cancer.    POSTOPERATIVE DIAGNOSIS:  Recurrent bladder cancer.    PROCEDURE:  Transurethral resection of bladder tumor (small, less than 2 cm).    SURGEON:  Jim Murphy MD    ASSISTANT:  None.    ANESTHESIOLOGIST:  Janel Kamara MD    TYPE OF ANESTHESIA:  General.    INDICATIONS:  Surveillance cystoscopy revealed recurrent tumor at the bladder   neck.    DESCRIPTION OF PROCEDURE:  The patient was identified in the holding area.  He   was taken to the operative suite.  General anesthesia was administered by Dr. Kamara.  He was positioned in the dorsal lithotomy position, sterilely prepped   and draped.  Cefazolin was given intravenously.  A 26-Indonesian continuous flow   resectoscope with visual obturator was advanced.  There was mild narrowing in   the bulbar urethra, which was dilated with the scope.  Prostate was moderately   large.  The lumen of the bladder showed minimal trabeculation and a 1-1.5 cm   tumor at the anterior bladder neck.  To access this, I had to resect the   anterior prostate just at the bladder neck junction.  Then, the tumor was   resected.  TUR chips were collected.  The area of resection was carefully   inspected and coagulated.  No active bleeding was noted at the end of the   procedure.    The bladder was again carefully inspected and no additional tumors were noted.    Having collected the specimens and submitted it for pathologic analysis and   assured that there was no bleeding, the bladder was left partially filled.    The patient was sent to recovery room in stable condition.  He suffered no   intraoperative complications.       ____________________________________     Jim Murphy MD    JAKATELYN / NTS    DD:  06/06/2019 17:12:13  DT:  06/06/2019 18:23:45    D#:  [id number]  Job#:  [id number]

## 2019-06-07 NOTE — ANESTHESIA TIME REPORT
Anesthesia Start and Stop Event Times     Date Time Event    6/6/2019 1631 Anesthesia Start     1716 Anesthesia Stop        Responsible Staff  06/06/19    Name Role Begin End    Janel Kamara M.D. Anesth 1631 1716        Preop Diagnosis (Free Text):  Pre-op Diagnosis     HISTORY OF BLADDER CANCER        Preop Diagnosis (Codes):  Diagnosis Information     Diagnosis Code(s):         Post op Diagnosis  History of bladder cancer      Premium Reason  A. 3PM - 7AM    Comments:

## 2019-06-07 NOTE — OR NURSING
1830: Assumed care of patient from PACU, see flow sheets for vital signs. Patient alert and oriented times four. Assessment completed. Pain is controlled and tolerable for patient. Patient updated of plan of care for discharge. Family/friend at bedside with patient. Discharge instructions reviewed and all questions answered. All needs met, patient dressed and safe to discharge home.   Patient tolerating fluids and food with no nausea.    1839: Patient up to void, Patient ready to go, last vital signs in flow sheet. PIV removed. Patient taken to car in wheelchair. Patient safe to discharge.

## 2019-06-07 NOTE — OR SURGEON
Immediate Post OP Note    PreOp Diagnosis: recurrent bladder cancer    PostOp Diagnosis: same    Procedure(s):  CYSTOSCOPY  TRANS URETHRAL RESECTION BLADDER-  TUMOR    Surgeon(s):  Jim Murphy M.D.    Anesthesiologist/Type of Anesthesia:  Anesthesiologist: Janel Kamara M.D./* No anesthesia type entered *    Surgical Staff:  Circulator: Mike Cruz R.N.    Specimens removed if any:  * No specimens in log *    Estimated Blood Loss: minimal    Findings: tumor at 12 o'clock at BN    Complications: none        6/6/2019 5:09 PM Jim Murphy M.D.

## 2019-06-07 NOTE — ANESTHESIA QCDR
2019 [facility] Clinical Data Registry (for Quality Improvement)     Postoperative nausea/vomiting risk protocol (Adult = 18 yrs and Pediatric 3-17 yrs)- (430 and 463)  General inhalation anesthetic (NOT TIVA) with PONV risk factors: Yes  Provision of anti-emetic therapy with at least 2 different classes of agents: Yes   Patient DID NOT receive anti-emetic therapy and reason is documented in Medical Record:  N/A    Multimodal Pain Management- (AQI59)  Patient undergoing Elective Surgery (i.e. Outpatient, or ASC, or Prescheduled Surgery prior to Hospital Admission): Yes  Use of Multimodal Pain Management, two or more drugs and/or interventions, NOT including systemic opioids: Yes   Exception: Documented allergy to multiple classes of analgesics:  N/A    PACU assessment of acute postoperative pain prior to Anesthesia Care End- Applies to Patients Age = 18- (ABG7)  Initial PACU pain score is which of the following: < 7/10  Patient unable to report pain score: N/A    Post-anesthetic transfer of care checklist/protocol to PACU/ICU- (426 and 427)  Upon conclusion of case, patient transferred to which of the following locations: PACU/Non-ICU  Use of transfer checklist/protocol: Yes  Exclusion: Service Performed in Patient Hospital Room (and thus did not require transfer): N/A    PACU Reintubation- (AQI31)  General anesthesia requiring endotracheal intubation (ETT) along with subsequent extubation in OR or PACU: No  Required reintubation in the PACU: N/A  Extubation was a planned trial documented in the medical record prior to removal of the original airway device: N/A    Unplanned admission to ICU related to anesthesia service up through end of PACU care- (MD51)  Unplanned admission to ICU (not initially anticipated at anesthesia start time): No

## 2019-06-07 NOTE — DISCHARGE INSTRUCTIONS
Cystoscopy  Cystoscopy is a procedure that is used to help diagnose and sometimes treat conditions that affect that lower urinary tract. The lower urinary tract includes the bladder and the tube that drains urine from the bladder out of the body (urethra). Cystoscopy is performed with a thin, tube-shaped instrument with a light and camera at the end (cystoscope). The cystoscope may be hard (rigid) or flexible, depending on the goal of the procedure. The cystoscope is inserted through the urethra, into the bladder.  Cystoscopy may be recommended if you have:  · Urinary tractinfections that keep coming back (recurring).  · Blood in the urine (hematuria).  · Loss of bladder control (urinary incontinence) or an overactive bladder.  · Unusual cells found in a urine sample.  · A blockage in the urethra.  · Painful urination.  · An abnormality in the bladder found during an intravenous pyelogram (IVP) or CT scan.  Cystoscopy may also be done to remove a sample of tissue to be examined under a microscope (biopsy).  Tell a health care provider about:  · Any allergies you have.  · All medicines you are taking, including vitamins, herbs, eye drops, creams, and over-the-counter medicines.  · Any problems you or family members have had with anesthetic medicines.  · Any blood disorders you have.  · Any surgeries you have had.  · Any medical conditions you have.  · Whether you are pregnant or may be pregnant.  What are the risks?  Generally, this is a safe procedure. However, problems may occur, including:  · Infection.  · Bleeding.  · Allergic reactions to medicines.  · Damage to other structures or organs.  What happens before the procedure?  · Ask your health care provider about:  ¨ Changing or stopping your regular medicines. This is especially important if you are taking diabetes medicines or blood thinners.  ¨ Taking medicines such as aspirin and ibuprofen. These medicines can thin your blood. Do not take these medicines  before your procedure if your health care provider instructs you not to.  · Follow instructions from your health care provider about eating or drinking restrictions.  · You may be given antibiotic medicine to help prevent infection.  · You may have an exam or testing, such as X-rays of the bladder, urethra, or kidneys.  · You may have urine tests to check for signs of infection.  · Plan to have someone take you home after the procedure.  What happens during the procedure?  · To reduce your risk of infection, your health care team will wash or sanitize their hands.  · You will be given one or more of the following:  ¨ A medicine to help you relax (sedative).  ¨ A medicine to numb the area (local anesthetic).  · The area around the opening of your urethra will be cleaned.  · The cystoscope will be passed through your urethra into your bladder.  · Germ-free (sterile) fluid will flow through the cystoscope to fill your bladder. The fluid will stretch your bladder so that your surgeon can clearly examine your bladder walls.  · The cystoscope will be removed and your bladder will be emptied.  The procedure may vary among health care providers and hospitals.  What happens after the procedure?  · You may have some soreness or pain in your abdomen and urethra. Medicines will be available to help you.  · You may have some blood in your urine.  · Do not drive for 24 hours if you received a sedative.  This information is not intended to replace advice given to you by your health care provider. Make sure you discuss any questions you have with your health care provider.    ACTIVITY: Rest and take it easy for the first 24 hours.  A responsible adult is recommended to remain with you during that time.  It is normal to feel sleepy.  We encourage you to not do anything that requires balance, judgment or coordination.    MILD FLU-LIKE SYMPTOMS ARE NORMAL. YOU MAY EXPERIENCE GENERALIZED MUSCLE ACHES, THROAT IRRITATION, HEADACHE AND/OR  SOME NAUSEA.    FOR 24 HOURS DO NOT:  Drive, operate machinery or run household appliances.  Drink beer or alcoholic beverages.   Make important decisions or sign legal documents.      DIET: To avoid nausea, slowly advance diet as tolerated, avoiding spicy or greasy foods for the first day.  Add more substantial food to your diet according to your physician's instructions.  Babies can be fed formula or breast milk as soon as they are hungry.  INCREASE FLUIDS AND FIBER TO AVOID CONSTIPATION.    SURGICAL DRESSING/BATHING: Ok to shower post op day 1.    FOLLOW-UP APPOINTMENT:  A follow-up appointment should be arranged with your doctor in 1-2 weeks; call to schedule.    You should CALL YOUR PHYSICIAN if you develop:  Fever greater than 101 degrees F.  Pain not relieved by medication, or persistent nausea or vomiting.  Excessive bleeding (blood soaking through dressing) or unexpected drainage from the wound.  Extreme redness or swelling around the incision site, drainage of pus or foul smelling drainage.  Inability to urinate or empty your bladder within 8 hours.  Problems with breathing or chest pain.    You should call 911 if you develop problems with breathing or chest pain.  If you are unable to contact your doctor or surgical center, you should go to the nearest emergency room or urgent care center.  Physician's telephone #: Dr. Murphy [phone].    If any questions arise, call your doctor.  If your doctor is not available, please feel free to call the Surgical Center at [phone].  The Center is open Monday through Friday from 7AM to 7PM.  You can also call the [facility] HOTLINE open 24 hours/day, 7 days/week and speak to a nurse at [phone], or toll free at [phone].    A registered nurse may call you a few days after your surgery to see how you are doing after your procedure.    MEDICATIONS: Resume taking daily medication.  Take prescribed pain medication with food.  If no medication is  prescribed, you may take non-aspirin pain medication if needed.  PAIN MEDICATION CAN BE VERY CONSTIPATING.  Take a stool softener or laxative such as senokot, pericolace, or milk of magnesia if needed.    Prescription given for PYRIDIUM.  Last pain medication given at 5:35.    If your physician has prescribed pain medication that includes Acetaminophen (Tylenol), do not take additional Acetaminophen (Tylenol) while taking the prescribed medication.    Depression / Suicide Risk    As you are discharged from this [facility] facility, it is important to learn how to keep safe from harming yourself.    Recognize the warning signs:  · Abrupt changes in personality, positive or negative- including increase in energy   · Giving away possessions  · Change in eating patterns- significant weight changes-  positive or negative  · Change in sleeping patterns- unable to sleep or sleeping all the time   · Unwillingness or inability to communicate  · Depression  · Unusual sadness, discouragement and loneliness  · Talk of wanting to die  · Neglect of personal appearance   · Rebelliousness- reckless behavior  · Withdrawal from people/activities they love  · Confusion- inability to concentrate     If you or a loved one observes any of these behaviors or has concerns about self-harm, here's what you can do:  · Talk about it- your feelings and reasons for harming yourself  · Remove any means that you might use to hurt yourself (examples: pills, rope, extension cords, firearm)  · Get professional help from the community (Mental Health, Substance Abuse, psychological counseling)  · Do not be alone:Call your Safe Contact- someone whom you trust who will be there for you.  · Call your local CRISIS HOTLINE 994-3531 or 583-993-6592  · Call your local Children's Mobile Crisis Response Team [place] (284) 686-4349 or www.Tongal  · Call the toll free National Suicide Prevention Hotlines   · National Suicide Prevention Lifeline  160-418-ETZL (2901)  · National [place] Line Network 800-SUICIDE (824-1860)

## 2020-03-09 DIAGNOSIS — Z12.5 SCREENING FOR PROSTATE CANCER: ICD-10-CM

## 2020-03-09 DIAGNOSIS — Z13.1 SCREENING FOR DIABETES MELLITUS: ICD-10-CM

## 2020-03-09 DIAGNOSIS — D72.819 LEUKOPENIA, UNSPECIFIED TYPE: ICD-10-CM

## 2020-03-09 DIAGNOSIS — Z13.6 SCREENING FOR ISCHEMIC HEART DISEASE: ICD-10-CM

## 2020-03-19 ENCOUNTER — OFFICE VISIT (OUTPATIENT)
Dept: MEDICAL GROUP | Facility: MEDICAL CENTER | Age: 65
End: 2020-03-19
Payer: COMMERCIAL

## 2020-03-19 VITALS
HEIGHT: 69 IN | BODY MASS INDEX: 25.62 KG/M2 | DIASTOLIC BLOOD PRESSURE: 68 MMHG | OXYGEN SATURATION: 95 % | RESPIRATION RATE: 18 BRPM | TEMPERATURE: 97.3 F | SYSTOLIC BLOOD PRESSURE: 122 MMHG | HEART RATE: 72 BPM | WEIGHT: 173 LBS

## 2020-03-19 DIAGNOSIS — Z13.1 SCREENING FOR DIABETES MELLITUS: ICD-10-CM

## 2020-03-19 DIAGNOSIS — Z00.00 HEALTHCARE MAINTENANCE: ICD-10-CM

## 2020-03-19 DIAGNOSIS — Z23 NEED FOR VACCINATION: ICD-10-CM

## 2020-03-19 DIAGNOSIS — D72.819 LEUKOPENIA, UNSPECIFIED TYPE: ICD-10-CM

## 2020-03-19 DIAGNOSIS — E78.5 DYSLIPIDEMIA: ICD-10-CM

## 2020-03-19 DIAGNOSIS — Z13.6 SCREENING FOR ISCHEMIC HEART DISEASE: ICD-10-CM

## 2020-03-19 DIAGNOSIS — C67.9 MALIGNANT NEOPLASM OF URINARY BLADDER, UNSPECIFIED SITE (HCC): ICD-10-CM

## 2020-03-19 DIAGNOSIS — Z12.5 SCREENING FOR PROSTATE CANCER: ICD-10-CM

## 2020-03-19 PROCEDURE — 90662 IIV NO PRSV INCREASED AG IM: CPT | Performed by: FAMILY MEDICINE

## 2020-03-19 PROCEDURE — 99397 PER PM REEVAL EST PAT 65+ YR: CPT | Mod: 25 | Performed by: FAMILY MEDICINE

## 2020-03-19 PROCEDURE — 90472 IMMUNIZATION ADMIN EACH ADD: CPT | Performed by: FAMILY MEDICINE

## 2020-03-19 PROCEDURE — 90732 PPSV23 VACC 2 YRS+ SUBQ/IM: CPT | Performed by: FAMILY MEDICINE

## 2020-03-19 PROCEDURE — 90471 IMMUNIZATION ADMIN: CPT | Performed by: FAMILY MEDICINE

## 2020-03-19 ASSESSMENT — FIBROSIS 4 INDEX: FIB4 SCORE: 1.24

## 2020-03-19 ASSESSMENT — PATIENT HEALTH QUESTIONNAIRE - PHQ9: CLINICAL INTERPRETATION OF PHQ2 SCORE: 0

## 2020-03-19 NOTE — LETTER
Date: 20    From: [facility]  [address]  [address]  [place] [address]  Phone:   [phone]  Fax:   [phone]    Recipient: [facility]    Recipient Fax Number: [phone]   Patient: Jareth Sunshine   : 1955   Primary Care Provider: Guero Diaz M.D.     Message:      Pt. Referred by Dr. Diaz for a colonoscopy test about 2-3 years ago.   Would you please fax the report.   Let me know if you have any questions.   Thank you,     Zora SOTOMAYOR   [phone]        Confidentiality / Privacy Note: If you are not the intended recipient of this document, this document should be returned to [facility] immediately. Please contact the sender at [facility] so that we can arrange for the return of this transmission to us at no cost to you. The document accompanying this transmission contains information from [facility], which is confidential, proprietary or copyrighted and is intended solely for the use of the individual or entity named on this transaction. If you are not the intended recipient, you are notified that disclosing, copying, distributing or taking any action in reliance on the contents of this information is strictly prohibited. This prohibition includes, without limitations, displaying this transmission or any portion thereof, on any public bulletin board.            [address] [place] [id number] - [phone] - [facility]

## 2020-03-19 NOTE — PROGRESS NOTES
"[facility]  Progress Note  Established Patient    Subjective:   Jareth Sunshine is a 64 y.o. male here today for a wellness exam. The patient is alone.     Healthcare maintenance  Lipids: ordered, reminded patient to get.   Fasting Glucose: ordered, reminded patient to get.   Hepatitis C Screen: done 2018 and normal.   Colonoscopy: records requested.     Tdap: given 04/23/18.  Pneumonia Vaccines: Prevnar given 04/23/18. Pneumovax given 03/19/20.   Shingrix: recommended, patient will get at pharmacy.     Bladder cancer (HCC)  Patient is now following with urology for this issue.    Dyslipidemia  Patient's dyslipidemia is controlled with atorvastatin.  He is due for labs.    Leukopenia  Noted on past labs.  This is mild.      Current Outpatient Medications on File Prior to Visit   Medication Sig Dispense Refill   • atorvastatin (LIPITOR) 10 MG Tab Take 10 mg by mouth every evening.       No current facility-administered medications on file prior to visit.        Past Medical History:   Diagnosis Date   • Cancer (HCC)     Bladder, s/p treatment   • Hyperlipidemia    • Renal disorder     kidney stone       Allergies: Patient has no known allergies.    Surgical History:  has a past surgical history that includes other; other; cystoscopy (N/A, 6/6/2019); and trans urethral resection bladder (N/A, 6/6/2019).    Family History: family history includes Arthritis in his mother; Diabetes in his sister; No Known Problems in his father.    Social History:  reports that he has never smoked. He has never used smokeless tobacco. He reports current alcohol use. He reports that he does not use drugs.    ROS: no CP or SOB.        Objective:     Vitals:    03/19/20 1300   BP: 122/68   BP Location: Left arm   Patient Position: Sitting   BP Cuff Size: Adult   Pulse: 72   Resp: 18   Temp: 36.3 °C (97.3 °F)   TempSrc: Temporal   SpO2: 95%   Weight: 78.5 kg (173 lb)   Height: 1.74 m (5' 8.5\")       Physical Exam:  General: " alert in no apparent distress.   Cardio: regular rate and rhythm, no murmurs, rubs or gallops.   Resp: CTAB no w/r/r.         Assessment and Plan:     1. Healthcare maintenance  - see HPI.   -Age-appropriate anticipatory guidance discussed including diet and exercise.  -Pneumovax and influenza vaccines given today.  -Patient will go to pharmacy for Shingrix vaccination  - Lipid Profile; Future  - Comp Metabolic Panel; Future  - PROSTATE SPECIFIC AG SCREENING; Future  - CBC WITH DIFFERENTIAL; Future    2. Dyslipidemia  - continue statin.     3. Malignant neoplasm of urinary bladder, unspecified site (HCC)  - f/u urology.     4. Leukopenia, unspecified type  - CBC WITH DIFFERENTIAL; Future    5. Screening for ischemic heart disease  - Lipid Profile; Future    6. Screening for diabetes mellitus  - Comp Metabolic Panel; Future    7. Screening for prostate cancer  - PROSTATE SPECIFIC AG SCREENING; Future    8. Need for vaccination  - Pneumovax Vaccine (PPSV23)  - Influenza Vaccine, High Dose (65+ Only)        Followup: Return in about 1 year (around 3/19/2021), or if symptoms worsen or fail to improve, for Wellness Visit, Long.

## 2020-03-19 NOTE — ASSESSMENT & PLAN NOTE
Lipids: ordered, reminded patient to get.   Fasting Glucose: ordered, reminded patient to get.   Hepatitis C Screen: done 2018 and normal.   Colonoscopy: records requested.     Tdap: given 04/23/18.  Pneumonia Vaccines: Prevnar given 04/23/18. Pneumovax given 03/19/20.   Shingrix: recommended, patient will get at pharmacy.

## 2020-07-16 RX ORDER — ATORVASTATIN CALCIUM 10 MG/1
TABLET, FILM COATED ORAL
Qty: 100 TAB | Refills: 4 | Status: SHIPPED | OUTPATIENT
Start: 2020-07-16 | End: 2021-07-20

## 2020-07-16 NOTE — TELEPHONE ENCOUNTER
Received request via: Pharmacy    Was the patient seen in the last year in this department? Yes    Does the patient have an active prescription (recently filled or refills available) for medication(s) requested? No     Future Appointments       Provider Department Center    3/19/2021 9:00 AM Guero Diaz M.D. [facility]

## 2020-10-20 ENCOUNTER — HOSPITAL ENCOUNTER (OUTPATIENT)
Dept: LAB | Facility: MEDICAL CENTER | Age: 65
End: 2020-10-20
Attending: FAMILY MEDICINE
Payer: COMMERCIAL

## 2020-10-20 DIAGNOSIS — Z00.00 HEALTHCARE MAINTENANCE: ICD-10-CM

## 2020-10-20 DIAGNOSIS — D72.819 LEUKOPENIA, UNSPECIFIED TYPE: ICD-10-CM

## 2020-10-20 DIAGNOSIS — Z13.1 SCREENING FOR DIABETES MELLITUS: ICD-10-CM

## 2020-10-20 DIAGNOSIS — Z12.5 SCREENING FOR PROSTATE CANCER: ICD-10-CM

## 2020-10-20 DIAGNOSIS — Z13.6 SCREENING FOR ISCHEMIC HEART DISEASE: ICD-10-CM

## 2020-10-20 LAB
ALBUMIN SERPL BCP-MCNC: 4.1 G/DL (ref 3.2–4.9)
ALBUMIN/GLOB SERPL: 1.4 G/DL
ALP SERPL-CCNC: 78 U/L (ref 30–99)
ALT SERPL-CCNC: 25 U/L (ref 2–50)
ANION GAP SERPL CALC-SCNC: 9 MMOL/L (ref 7–16)
AST SERPL-CCNC: 19 U/L (ref 12–45)
BASOPHILS # BLD AUTO: 1 % (ref 0–1.8)
BASOPHILS # BLD: 0.04 K/UL (ref 0–0.12)
BILIRUB SERPL-MCNC: 1 MG/DL (ref 0.1–1.5)
BUN SERPL-MCNC: 11 MG/DL (ref 8–22)
CALCIUM SERPL-MCNC: 9.3 MG/DL (ref 8.5–10.5)
CHLORIDE SERPL-SCNC: 102 MMOL/L (ref 96–112)
CHOLEST SERPL-MCNC: 154 MG/DL (ref 100–199)
CO2 SERPL-SCNC: 26 MMOL/L (ref 20–33)
CREAT SERPL-MCNC: 1.02 MG/DL (ref 0.5–1.4)
EOSINOPHIL # BLD AUTO: 0.07 K/UL (ref 0–0.51)
EOSINOPHIL NFR BLD: 1.8 % (ref 0–6.9)
ERYTHROCYTE [DISTWIDTH] IN BLOOD BY AUTOMATED COUNT: 43.2 FL (ref 35.9–50)
FASTING STATUS PATIENT QL REPORTED: NORMAL
GLOBULIN SER CALC-MCNC: 2.9 G/DL (ref 1.9–3.5)
GLUCOSE SERPL-MCNC: 85 MG/DL (ref 65–99)
HCT VFR BLD AUTO: 50.6 % (ref 42–52)
HDLC SERPL-MCNC: 56 MG/DL
HGB BLD-MCNC: 16.6 G/DL (ref 14–18)
IMM GRANULOCYTES # BLD AUTO: 0.01 K/UL (ref 0–0.11)
IMM GRANULOCYTES NFR BLD AUTO: 0.3 % (ref 0–0.9)
LDLC SERPL CALC-MCNC: 75 MG/DL
LYMPHOCYTES # BLD AUTO: 1.01 K/UL (ref 1–4.8)
LYMPHOCYTES NFR BLD: 26 % (ref 22–41)
MCH RBC QN AUTO: 30.8 PG (ref 27–33)
MCHC RBC AUTO-ENTMCNC: 32.8 G/DL (ref 33.7–35.3)
MCV RBC AUTO: 93.9 FL (ref 81.4–97.8)
MONOCYTES # BLD AUTO: 0.5 K/UL (ref 0–0.85)
MONOCYTES NFR BLD AUTO: 12.9 % (ref 0–13.4)
NEUTROPHILS # BLD AUTO: 2.26 K/UL (ref 1.82–7.42)
NEUTROPHILS NFR BLD: 58 % (ref 44–72)
NRBC # BLD AUTO: 0 K/UL
NRBC BLD-RTO: 0 /100 WBC
PLATELET # BLD AUTO: 237 K/UL (ref 164–446)
PMV BLD AUTO: 9.8 FL (ref 9–12.9)
POTASSIUM SERPL-SCNC: 4.9 MMOL/L (ref 3.6–5.5)
PROT SERPL-MCNC: 7 G/DL (ref 6–8.2)
PSA SERPL-MCNC: 0.91 NG/ML (ref 0–4)
RBC # BLD AUTO: 5.39 M/UL (ref 4.7–6.1)
SODIUM SERPL-SCNC: 137 MMOL/L (ref 135–145)
TRIGL SERPL-MCNC: 114 MG/DL (ref 0–149)
WBC # BLD AUTO: 3.9 K/UL (ref 4.8–10.8)

## 2020-10-20 PROCEDURE — 80053 COMPREHEN METABOLIC PANEL: CPT

## 2020-10-20 PROCEDURE — 85025 COMPLETE CBC W/AUTO DIFF WBC: CPT

## 2020-10-20 PROCEDURE — 36415 COLL VENOUS BLD VENIPUNCTURE: CPT

## 2020-10-20 PROCEDURE — 84153 ASSAY OF PSA TOTAL: CPT

## 2020-10-20 PROCEDURE — 80061 LIPID PANEL: CPT

## 2021-03-03 DIAGNOSIS — Z23 NEED FOR VACCINATION: ICD-10-CM

## 2021-03-19 ENCOUNTER — OFFICE VISIT (OUTPATIENT)
Dept: MEDICAL GROUP | Facility: MEDICAL CENTER | Age: 66
End: 2021-03-19
Payer: COMMERCIAL

## 2021-03-19 VITALS
DIASTOLIC BLOOD PRESSURE: 68 MMHG | TEMPERATURE: 97.3 F | SYSTOLIC BLOOD PRESSURE: 112 MMHG | WEIGHT: 179.6 LBS | HEIGHT: 69 IN | HEART RATE: 75 BPM | RESPIRATION RATE: 18 BRPM | BODY MASS INDEX: 26.6 KG/M2 | OXYGEN SATURATION: 96 %

## 2021-03-19 DIAGNOSIS — Z13.6 SCREENING FOR ISCHEMIC HEART DISEASE: ICD-10-CM

## 2021-03-19 DIAGNOSIS — D72.819 LEUKOPENIA, UNSPECIFIED TYPE: ICD-10-CM

## 2021-03-19 DIAGNOSIS — E78.5 DYSLIPIDEMIA: ICD-10-CM

## 2021-03-19 DIAGNOSIS — Z12.5 SCREENING FOR PROSTATE CANCER: ICD-10-CM

## 2021-03-19 DIAGNOSIS — Z00.00 HEALTHCARE MAINTENANCE: ICD-10-CM

## 2021-03-19 DIAGNOSIS — Z13.1 SCREENING FOR DIABETES MELLITUS: ICD-10-CM

## 2021-03-19 PROCEDURE — 99397 PER PM REEVAL EST PAT 65+ YR: CPT | Performed by: FAMILY MEDICINE

## 2021-03-19 ASSESSMENT — ACTIVITIES OF DAILY LIVING (ADL): BATHING_REQUIRES_ASSISTANCE: 0

## 2021-03-19 ASSESSMENT — PATIENT HEALTH QUESTIONNAIRE - PHQ9
SUM OF ALL RESPONSES TO PHQ QUESTIONS 1-9: 0
CLINICAL INTERPRETATION OF PHQ2 SCORE: 0

## 2021-03-19 ASSESSMENT — ENCOUNTER SYMPTOMS: GENERAL WELL-BEING: EXCELLENT

## 2021-03-19 ASSESSMENT — FIBROSIS 4 INDEX: FIB4 SCORE: 1.07

## 2021-03-19 NOTE — PROGRESS NOTES
Chief Complaint   Patient presents with   • Annual Exam         HPI:  Jareth is a 65 y.o. here for Medicare Annual Wellness Visit    Healthcare maintenance  Lipids: ordered.   Fasting Glucose: ordered.   Hepatitis C Screen: done 2018 and normal.   Colonoscopy: done 5/2018 with 8 year recall.     Tdap: given 04/23/18.  Pneumonia Vaccines: Prevnar given 04/23/18. Pneumovax given 03/19/20.      Dyslipidemia  Treated with atorvastatin.     Leukopenia  Mild and stable.       Patient Active Problem List    Diagnosis Date Noted   • Leukopenia 03/19/2020   • Healthcare maintenance 01/22/2018   • Bladder cancer (HCC) 01/22/2018   • Dyslipidemia 01/22/2018       Current Outpatient Medications   Medication Sig Dispense Refill   • atorvastatin (LIPITOR) 10 MG Tab TAKE ONE TABLET BY MOUTH EVERY  Tab 4     No current facility-administered medications for this visit.        Patient is taking medications as noted in medication list.  Current supplements as per medication list.     Allergies: Patient has no known allergies.    Current social contact/activities: Pt. Stays in touch with family and friends. Pt. Enjoys the outdoor activities      Is patient current with immunizations? Yes.    He  reports that he has never smoked. He has never used smokeless tobacco. He reports current alcohol use. He reports that he does not use drugs.  Counseling given: Not Answered        DPA/Advanced directive: Patient has Living Will and Durable Power of , but it is not on file. Instructed to bring in a copy to scan into their chart.    ROS:    Gait: Uses no assistive device   Ostomy: No   Other tubes: No   Amputations: No   Chronic oxygen use No   Last eye exam 5 years ago   Wears hearing aids: No   : Denies any urinary leakage during the last 6 months      Depression Screening    Little interest or pleasure in doing things?  0 - not at all  Feeling down, depressed, or hopeless? 0 - not at all  Patient Health Questionnaire Score:  0    If depressive symptoms identified deferred to follow up visit unless specifically addressed in assessment and plan.    Interpretation of PHQ-9 Total Score   Score Severity   1-4 No Depression   5-9 Mild Depression   10-14 Moderate Depression   15-19 Moderately Severe Depression   20-27 Severe Depression    Screening for Cognitive Impairment    Three Minute Recall (river, terra, finger)   /3 3/3  Josue clock face with all 12 numbers and set the hands to show 10 past 11.  Yes 5/5  If cognitive concerns identified, deferred for follow up unless specifically addressed in assessment and plan.    Fall Risk Assessment    Has the patient had two or more falls in the last year or any fall with injury in the last year?  No  If fall risk identified, deferred for follow up unless specifically addressed in assessment and plan.    Safety Assessment    Throw rugs on floor.  No  Handrails on all stairs.  Yes  Good lighting in all hallways.  Yes  Difficulty hearing.  No  Patient counseled about all safety risks that were identified.    Functional Assessment ADLs    Are there any barriers preventing you from cooking for yourself or meeting nutritional needs?  No.    Are there any barriers preventing you from driving safely or obtaining transportation?  No.    Are there any barriers preventing you from using a telephone or calling for help?  No.    Are there any barriers preventing you from shopping?  No.    Are there any barriers preventing you from taking care of your own finances?  No.    Are there any barriers preventing you from managing your medications?  No.    Are there any barriers preventing you from showering, bathing or dressing yourself?  No.    Are you currently engaging in any exercise or physical activity?  Yes.  Pt. Hikes, walks, exercises with his treadmill, skiing and golf's.   What is your perception of your health?  Excellent.    Health Maintenance Summary                COVID-19 Vaccine Next Due 3/21/2021   "    Done 2/28/2021 Imm Admin: Pfizer SARS-CoV-2 Vaccine    COLONOSCOPY Next Due 5/31/2026      Done 5/31/2018 REFERRAL TO GI FOR COLONOSCOPY    IMM DTaP/Tdap/Td Vaccine Next Due 4/23/2028      Done 4/23/2018 Imm Admin: Tdap Vaccine          Patient Care Team:  Guero Diaz M.D. as PCP - General (Family Medicine)  Jim Murphy M.D. as Consulting Physician (Urology)    Social History     Tobacco Use   • Smoking status: Never Smoker   • Smokeless tobacco: Never Used   Substance Use Topics   • Alcohol use: Yes     Comment: 0-2 per month   • Drug use: No     Family History   Problem Relation Age of Onset   • Arthritis Mother    • No Known Problems Father    • Diabetes Sister      He  has a past medical history of Cancer (HCC), Hyperlipidemia, and Renal disorder.   Past Surgical History:   Procedure Laterality Date   • CYSTOSCOPY N/A 6/6/2019    Procedure: CYSTOSCOPY;  Surgeon: Jim Murphy M.D.;  Location: SURGERY [facility];  Service: Urology   • TRANS URETHRAL RESECTION BLADDER N/A 6/6/2019    Procedure: TRANS URETHRAL RESECTION BLADDER-  TUMOR;  Surgeon: Jim Murphy M.D.;  Location: SURGERY [facility];  Service: Urology   • OTHER      Bladder laser surgery for cancer   • OTHER      Jaw surgery 35 years ago           Exam:     /68 (BP Location: Left arm, Patient Position: Sitting, BP Cuff Size: Adult long)   Pulse 75   Temp 36.3 °C (97.3 °F) (Temporal)   Resp 18   Ht 1.753 m (5' 9\")   Wt 81.5 kg (179 lb 9.6 oz)   SpO2 96%  Body mass index is 26.52 kg/m².    Hearing good.    Dentition unable to assess, wearing mask.   Alert, oriented in no acute distress.  Eye contact is good, speech goal directed, affect calm  Cardio: RRR no m/r/g.   Resp: CTAB.       Assessment and Plan. The following treatment and monitoring plan is recommended:      1. Healthcare maintenance  - see HPI.   - discussed diet/exercise.   - CBC WITH DIFFERENTIAL; Future  - Comp Metabolic Panel; Future  - Lipid " Profile; Future  - PROSTATE SPECIFIC AG SCREENING; Future    2. Dyslipidemia  - continue statin.     3. Leukopenia, unspecified type  - CBC WITH DIFFERENTIAL; Future    4. Screening for ischemic heart disease  - Lipid Profile; Future    5. Screening for diabetes mellitus  - Comp Metabolic Panel; Future    6. Screening for prostate cancer  - PROSTATE SPECIFIC AG SCREENING; Future      Services suggested: No services needed at this time  Health Care Screening recommendations as per orders if indicated.  Referrals offered: PT/OT/Nutrition counseling/Behavioral Health/Smoking cessation as per orders if indicated.    Discussion today about general wellness and lifestyle habits:    · Prevent falls and reduce trip hazards; Cautioned about securing or removing rugs.  · Have a working fire alarm and carbon monoxide detector;   · Engage in regular physical activity and social activities       Follow-up: Return in about 1 year (around 3/19/2022), or if symptoms worsen or fail to improve, for Wellness Visit, Long.

## 2021-03-19 NOTE — ASSESSMENT & PLAN NOTE
Lipids: ordered.   Fasting Glucose: ordered.   Hepatitis C Screen: done 2018 and normal.   Colonoscopy: done 5/2018 with 8 year recall.     Tdap: given 04/23/18.  Pneumonia Vaccines: Prevnar given 04/23/18. Pneumovax given 03/19/20.

## 2021-07-08 ENCOUNTER — OFFICE VISIT (OUTPATIENT)
Dept: URGENT CARE | Facility: CLINIC | Age: 66
End: 2021-07-08
Payer: COMMERCIAL

## 2021-07-08 VITALS
OXYGEN SATURATION: 98 % | TEMPERATURE: 97.5 F | WEIGHT: 178 LBS | HEIGHT: 69 IN | HEART RATE: 76 BPM | BODY MASS INDEX: 26.36 KG/M2 | SYSTOLIC BLOOD PRESSURE: 128 MMHG | DIASTOLIC BLOOD PRESSURE: 78 MMHG | RESPIRATION RATE: 16 BRPM

## 2021-07-08 DIAGNOSIS — L30.0 NUMMULAR ECZEMA: ICD-10-CM

## 2021-07-08 PROCEDURE — 99213 OFFICE O/P EST LOW 20 MIN: CPT | Performed by: PHYSICIAN ASSISTANT

## 2021-07-08 RX ORDER — CLOBETASOL PROPIONATE 0.5 MG/G
1 CREAM TOPICAL 2 TIMES DAILY
Qty: 60 G | Refills: 0 | Status: SHIPPED | OUTPATIENT
Start: 2021-07-08 | End: 2021-07-15

## 2021-07-08 ASSESSMENT — ENCOUNTER SYMPTOMS
COUGH: 0
DIARRHEA: 0
HEADACHES: 0
MYALGIAS: 0
ABDOMINAL PAIN: 0
PALPITATIONS: 0
VOMITING: 0
SHORTNESS OF BREATH: 0
NAUSEA: 0
FATIGUE: 0
WEIGHT LOSS: 0
CHILLS: 0
SORE THROAT: 0
DIZZINESS: 0
FEVER: 0

## 2021-07-08 ASSESSMENT — FIBROSIS 4 INDEX: FIB4 SCORE: 1.09

## 2021-07-08 NOTE — PROGRESS NOTES
Subjective:   Jareth Sunshine is a 66 y.o. male who presents for Rash (x1 month, buttucks area, itchy, very dry )      Rash  This is a new (1 month. Very puritic rash on buttocks. has tried topical antifungal with no improvement.) problem. The problem is unchanged. The affected locations include the right buttock and left buttock. The rash is characterized by scaling, itchiness and dryness. He was exposed to nothing. Pertinent negatives include no congestion, cough, diarrhea, fatigue, fever, joint pain, shortness of breath, sore throat or vomiting. Treatments tried: antifungal cream. The treatment provided no relief. His past medical history is significant for eczema.       Review of Systems   Constitutional: Negative for chills, fatigue, fever, malaise/fatigue and weight loss.   HENT: Negative for congestion and sore throat.    Respiratory: Negative for cough and shortness of breath.    Cardiovascular: Negative for chest pain and palpitations.   Gastrointestinal: Negative for abdominal pain, diarrhea, nausea and vomiting.   Musculoskeletal: Negative for joint pain and myalgias.   Skin: Positive for itching and rash.   Neurological: Negative for dizziness and headaches.       Medications:    • atorvastatin Tabs  • clobetasol Crea    Allergies: Patient has no known allergies.    Problem List: Jareth Sunshine does not have any pertinent problems on file.    Surgical History:  Past Surgical History:   Procedure Laterality Date   • CYSTOSCOPY N/A 6/6/2019    Procedure: CYSTOSCOPY;  Surgeon: Jim Murphy M.D.;  Location: [facility];  Service: Urology   • TRANS URETHRAL RESECTION BLADDER N/A 6/6/2019    Procedure: TRANS URETHRAL RESECTION BLADDER-  TUMOR;  Surgeon: Jim Murphy M.D.;  Location: [facility];  Service: Urology   • OTHER      Bladder laser surgery for cancer   • OTHER      Jaw surgery 35 years ago       Past Social Hx: Jareth Sunshine  reports that he has  "never smoked. He has never used smokeless tobacco. He reports current alcohol use. He reports that he does not use drugs.     Past Family Hx:  Jareth Sunshine family history includes Arthritis in his mother; Diabetes in his sister; No Known Problems in his father.     Problem list, medications, and allergies reviewed by myself today in Epic.     Objective:     /78   Pulse 76   Temp 36.4 °C (97.5 °F) (Temporal)   Resp 16   Ht 1.753 m (5' 9\")   Wt 80.7 kg (178 lb)   SpO2 98%   BMI 26.29 kg/m²     Physical Exam  Constitutional:       General: He is not in acute distress.     Appearance: Normal appearance. He is not ill-appearing, toxic-appearing or diaphoretic.   HENT:      Head: Normocephalic and atraumatic.   Eyes:      Conjunctiva/sclera: Conjunctivae normal.   Cardiovascular:      Rate and Rhythm: Normal rate and regular rhythm.      Pulses: Normal pulses.      Heart sounds: Normal heart sounds.   Pulmonary:      Effort: Pulmonary effort is normal.      Breath sounds: Normal breath sounds.   Musculoskeletal:      Cervical back: Normal range of motion.   Skin:     Capillary Refill: Capillary refill takes less than 2 seconds.      Findings: Erythema and rash present. Rash is papular and scaling.             Comments: Nummular erythematous scaling patch to the right buttock small patch starting on the left buttock. No tenderness or signs of infection.    Neurological:      Mental Status: He is alert.           Assessment/Plan:     Comments/MDM:     • Apply topical steroid as directed.   • Emollients recommended after bathing.   • F/u if no improvement or any worsening symptoms.     Diagnosis and associated orders:     1. Nummular eczema  clobetasol (TEMOVATE) 0.05 % Cream              Differential diagnosis, natural history, supportive care, and indications for immediate follow-up discussed.    Advised the patient to follow-up with the primary care physician for recheck, reevaluation, and " consideration of further management.    Please note that this dictation was created using voice recognition software. I have made reasonable attempt to correct obvious errors, but I expect that there are errors of grammar and possibly content that I did not discover before finalizing the note.    This note was electronically signed by ANISA Moscoso PA-C

## 2021-10-13 LAB
ALBUMIN SERPL-MCNC: 4.3 G/DL (ref 3.8–4.8)
ALBUMIN/GLOB SERPL: 1.7 {RATIO} (ref 1.2–2.2)
ALP SERPL-CCNC: 85 IU/L (ref 44–121)
ALT SERPL-CCNC: 23 IU/L (ref 0–44)
AST SERPL-CCNC: 19 IU/L (ref 0–40)
BASOPHILS # BLD AUTO: 0 X10E3/UL (ref 0–0.2)
BASOPHILS NFR BLD AUTO: 1 %
BILIRUB SERPL-MCNC: 1 MG/DL (ref 0–1.2)
BUN SERPL-MCNC: 10 MG/DL (ref 8–27)
BUN/CREAT SERPL: 10 (ref 10–24)
CALCIUM SERPL-MCNC: 9.3 MG/DL (ref 8.6–10.2)
CHLORIDE SERPL-SCNC: 105 MMOL/L (ref 96–106)
CHOLEST SERPL-MCNC: 164 MG/DL (ref 100–199)
CO2 SERPL-SCNC: 23 MMOL/L (ref 20–29)
CREAT SERPL-MCNC: 1.02 MG/DL (ref 0.76–1.27)
EOSINOPHIL # BLD AUTO: 0.1 X10E3/UL (ref 0–0.4)
EOSINOPHIL NFR BLD AUTO: 2 %
ERYTHROCYTE [DISTWIDTH] IN BLOOD BY AUTOMATED COUNT: 12.8 % (ref 11.6–15.4)
GLOBULIN SER CALC-MCNC: 2.6 G/DL (ref 1.5–4.5)
GLUCOSE SERPL-MCNC: 96 MG/DL (ref 65–99)
HCT VFR BLD AUTO: 49.5 % (ref 37.5–51)
HDLC SERPL-MCNC: 59 MG/DL
HGB BLD-MCNC: 16.8 G/DL (ref 13–17.7)
IMM GRANULOCYTES # BLD AUTO: 0 X10E3/UL (ref 0–0.1)
IMM GRANULOCYTES NFR BLD AUTO: 0 %
IMMATURE CELLS  115398: NORMAL
LABORATORY COMMENT REPORT: NORMAL
LDLC SERPL CALC-MCNC: 84 MG/DL (ref 0–99)
LYMPHOCYTES # BLD AUTO: 1.3 X10E3/UL (ref 0.7–3.1)
LYMPHOCYTES NFR BLD AUTO: 25 %
MCH RBC QN AUTO: 31.6 PG (ref 26.6–33)
MCHC RBC AUTO-ENTMCNC: 33.9 G/DL (ref 31.5–35.7)
MCV RBC AUTO: 93 FL (ref 79–97)
MONOCYTES # BLD AUTO: 0.5 X10E3/UL (ref 0.1–0.9)
MONOCYTES NFR BLD AUTO: 9 %
MORPHOLOGY BLD-IMP: NORMAL
NEUTROPHILS # BLD AUTO: 3.5 X10E3/UL (ref 1.4–7)
NEUTROPHILS NFR BLD AUTO: 63 %
NRBC BLD AUTO-RTO: NORMAL %
PLATELET # BLD AUTO: 262 X10E3/UL (ref 150–450)
POTASSIUM SERPL-SCNC: 4.8 MMOL/L (ref 3.5–5.2)
PROT SERPL-MCNC: 6.9 G/DL (ref 6–8.5)
PSA SERPL-MCNC: 0.9 NG/ML (ref 0–4)
RBC # BLD AUTO: 5.31 X10E6/UL (ref 4.14–5.8)
SODIUM SERPL-SCNC: 140 MMOL/L (ref 134–144)
TRIGL SERPL-MCNC: 116 MG/DL (ref 0–149)
VLDLC SERPL CALC-MCNC: 21 MG/DL (ref 5–40)
WBC # BLD AUTO: 5.5 X10E3/UL (ref 3.4–10.8)

## 2021-12-02 ENCOUNTER — TELEPHONE (OUTPATIENT)
Dept: HEALTH INFORMATION MANAGEMENT | Facility: OTHER | Age: 66
End: 2021-12-02

## 2021-12-21 PROBLEM — Z85.51 HISTORY OF BLADDER CANCER: Status: ACTIVE | Noted: 2021-12-21

## 2021-12-21 NOTE — TELEPHONE ENCOUNTER
Walk in after QSHA. No updates to pharmacy, has appt with PCP. Provided [facility] information and gym info

## 2022-03-23 ENCOUNTER — OFFICE VISIT (OUTPATIENT)
Dept: MEDICAL GROUP | Facility: MEDICAL CENTER | Age: 67
End: 2022-03-23
Payer: MEDICARE

## 2022-03-23 VITALS
RESPIRATION RATE: 20 BRPM | WEIGHT: 170.4 LBS | SYSTOLIC BLOOD PRESSURE: 114 MMHG | HEIGHT: 69 IN | OXYGEN SATURATION: 97 % | DIASTOLIC BLOOD PRESSURE: 64 MMHG | HEART RATE: 72 BPM | TEMPERATURE: 97.8 F | BODY MASS INDEX: 25.24 KG/M2

## 2022-03-23 DIAGNOSIS — R21 RASH: ICD-10-CM

## 2022-03-23 DIAGNOSIS — Z00.00 HEALTHCARE MAINTENANCE: ICD-10-CM

## 2022-03-23 DIAGNOSIS — Z85.51 HISTORY OF BLADDER CANCER: ICD-10-CM

## 2022-03-23 PROBLEM — C67.9 BLADDER CANCER (HCC): Status: RESOLVED | Noted: 2018-01-22 | Resolved: 2022-03-23

## 2022-03-23 PROCEDURE — G0438 PPPS, INITIAL VISIT: HCPCS | Performed by: FAMILY MEDICINE

## 2022-03-23 RX ORDER — CLOTRIMAZOLE AND BETAMETHASONE DIPROPIONATE 10; .64 MG/G; MG/G
1 CREAM TOPICAL 2 TIMES DAILY
Qty: 30 G | Refills: 0 | Status: SHIPPED | OUTPATIENT
Start: 2022-03-23 | End: 2022-03-30

## 2022-03-23 ASSESSMENT — ENCOUNTER SYMPTOMS: GENERAL WELL-BEING: EXCELLENT

## 2022-03-23 ASSESSMENT — ACTIVITIES OF DAILY LIVING (ADL): BATHING_REQUIRES_ASSISTANCE: 0

## 2022-03-23 ASSESSMENT — FIBROSIS 4 INDEX: FIB4 SCORE: 1.03

## 2022-03-23 ASSESSMENT — PATIENT HEALTH QUESTIONNAIRE - PHQ9: CLINICAL INTERPRETATION OF PHQ2 SCORE: 0

## 2022-03-23 NOTE — ASSESSMENT & PLAN NOTE
Patient describes a decades long history of an intermittent perianal rash that can be uncomfortable.  If he allows it to dry out, it tends to get worse so he tries to keep it moisturized.  Hydrocortisone does not seem to help but he has used only a few days of betamethasone and that seemed to help.

## 2022-03-23 NOTE — ASSESSMENT & PLAN NOTE
Lipids: Done 2021.  Fasting Glucose: Done 2021.  Hepatitis C Screen: done 2018 and normal.   Colonoscopy: done 5/2018 with 8 year recall.     Tdap: given 04/23/18.  Pneumonia Vaccines: Prevnar given 04/23/18. Pneumovax given 03/19/20.

## 2022-03-23 NOTE — PROGRESS NOTES
Chief Complaint   Patient presents with   • Annual Exam         HPI:  Jareth is a 66 y.o. here for Medicare Annual Wellness Visit    Rash  Patient describes a decades long history of an intermittent perianal rash that can be uncomfortable.  If he allows it to dry out, it tends to get worse so he tries to keep it moisturized.  Hydrocortisone does not seem to help but he has used only a few days of betamethasone and that seemed to help.    History of bladder cancer  The patient assures me that he continues to follow with urology.    Healthcare maintenance  Lipids: Done 2021.  Fasting Glucose: Done 2021.  Hepatitis C Screen: done 2018 and normal.   Colonoscopy: done 5/2018 with 8 year recall.     Tdap: given 04/23/18.  Pneumonia Vaccines: Prevnar given 04/23/18. Pneumovax given 03/19/20.        Patient Active Problem List    Diagnosis Date Noted   • Rash 03/23/2022   • History of bladder cancer 12/21/2021   • BMI 26.0-26.9,adult 12/21/2021   • Leukopenia 03/19/2020   • Healthcare maintenance 01/22/2018   • Dyslipidemia 01/22/2018       Current Outpatient Medications   Medication Sig Dispense Refill   • clotrimazole-betamethasone (LOTRISONE) 1-0.05 % Cream Apply 1 Application topically 2 times a day for 7 days. 30 g 0   • atorvastatin (LIPITOR) 10 MG Tab TAKE ONE TABLET BY MOUTH EVERY  tablet 4     No current facility-administered medications for this visit.        Patient is taking medications as noted in medication list.  Current supplements as per medication list.     Allergies: Patient has no known allergies.    Current social contact/activities: Pt stays in touch with family and friends. Pt. Visits with his family and enjoys the outdoors.     Is patient current with immunizations? Yes.    He  reports that he has never smoked. He has never used smokeless tobacco. He reports current alcohol use. He reports that he does not use drugs.  Counseling given: Not Answered    DPA/Advanced directive: Patient has  Living Will, but it is not on file. Instructed to bring in a copy to scan into their chart.    ROS:    Gait: Uses no assistive device   Ostomy: No   Other tubes: No   Amputations: No   Chronic oxygen use No   Last eye exam 5 years ago   Wears hearing aids: No   : Denies any urinary leakage during the last 6 months      Depression Screening  Little interest or pleasure in doing things?  0 - not at all  Feeling down, depressed, or hopeless? 0 - not at all  Patient Health Questionnaire Score: 0    If depressive symptoms identified deferred to follow up visit unless specifically addressed in assessment and plan.    Interpretation of PHQ-9 Total Score   Score Severity   1-4 No Depression   5-9 Mild Depression   10-14 Moderate Depression   15-19 Moderately Severe Depression   20-27 Severe Depression    Screening for Cognitive Impairment  Three Minute Recall (daughter, heaven, mountain)  3/3    Josue clock face with all 12 numbers and set the hands to show 10 past 11.  Yes 5/5  If cognitive concerns identified, deferred for follow up unless specifically addressed in assessment and plan.    Fall Risk Assessment  Has the patient had two or more falls in the last year or any fall with injury in the last year?  No  If fall risk identified, deferred for follow up unless specifically addressed in assessment and plan.    Safety Assessment  Throw rugs on floor.  Yes  Handrails on all stairs.  Yes  Good lighting in all hallways.  Yes  Difficulty hearing.  No  Patient counseled about all safety risks that were identified.    Functional Assessment ADLs  Are there any barriers preventing you from cooking for yourself or meeting nutritional needs?  No.    Are there any barriers preventing you from driving safely or obtaining transportation?  No.    Are there any barriers preventing you from using a telephone or calling for help?  No.    Are there any barriers preventing you from shopping?  No.    Are there any barriers preventing you  from taking care of your own finances?  No.    Are there any barriers preventing you from managing your medications?  No.    Are there any barriers preventing you from showering, bathing or dressing yourself?  No.    Are you currently engaging in any exercise or physical activity?  Yes.  Pt. Enjoys the outdoors; skiing, hiking  Works out on his treadmill  Pt. Walks and stays pretty active.  What is your perception of your health?  Excellent.    Health Maintenance Summary          COLORECTAL CANCER SCREENING (COLONOSCOPY - Every 8 Years) Next due on 5/31/2026 05/31/2018  REFERRAL TO GI FOR COLONOSCOPY          IMM DTaP/Tdap/Td Vaccine (2 - Td or Tdap) Next due on 4/23/2028 04/23/2018  Imm Admin: Tdap Vaccine          HEPATITIS C SCREENING  Completed    05/07/2018  HEP C VIRUS ANTIBODY          IMM PNEUMOCOCCAL VACCINE: 65+ Years (Series Information) Completed    03/19/2020  Imm Admin: Pneumococcal polysaccharide vaccine (PPSV-23)    04/23/2018  Imm Admin: Pneumococcal Conjugate Vaccine (Prevnar/PCV-13)          IMM ZOSTER VACCINES (Series Information) Completed    01/06/2021  Imm Admin: Zoster Vaccine Recombinant (RZV) (SHINGRIX)    10/16/2020  Imm Admin: Zoster Vaccine Recombinant (RZV) (SHINGRIX)          COVID-19 Vaccine (Series Information) Completed    09/30/2021  Imm Admin: Pfizer SARS-CoV-2 Vaccine 12+    03/21/2021  Imm Admin: Pfizer SARS-CoV-2 Vaccine 12+    02/28/2021  Imm Admin: Pfizer SARS-CoV-2 Vaccine          IMM INFLUENZA (Series Information) Completed    12/30/2021  Imm Admin: Influenza Vaccine Adult HD    10/22/2020  Imm Admin: Influenza Vaccine Adult HD    03/19/2020  Imm Admin: Influenza Vaccine Adult HD    01/22/2018  Imm Admin: Influenza Vaccine Quad Inj (Pf)          IMM HEP B VACCINE (Series Information) Aged Out    No completion history exists for this topic.          IMM MENINGOCOCCAL VACCINE (MCV4) (Series Information) Aged Out    No completion history exists for this topic.           "      Patient Care Team:  Guero Diaz M.D. as PCP - General (Family Medicine)  Guero Diaz M.D. as PCP - [facility] Paneled  Jim Murphy M.D. as Consulting Physician (Urology)    Social History     Tobacco Use   • Smoking status: Never Smoker   • Smokeless tobacco: Never Used   Vaping Use   • Vaping Use: Never used   Substance Use Topics   • Alcohol use: Yes     Comment: 0-2 per month   • Drug use: No     Family History   Problem Relation Age of Onset   • Arthritis Mother    • No Known Problems Father    • Diabetes Sister      He  has a past medical history of Cancer (HCC), Hyperlipidemia, and Renal disorder.   Past Surgical History:   Procedure Laterality Date   • CYSTOSCOPY N/A 6/6/2019    Procedure: CYSTOSCOPY;  Surgeon: Jim Murphy M.D.;  Location: SURGERY [facility];  Service: Urology   • TRANS URETHRAL RESECTION BLADDER N/A 6/6/2019    Procedure: TRANS URETHRAL RESECTION BLADDER-  TUMOR;  Surgeon: Jim Murphy M.D.;  Location: SURGERY [facility];  Service: Urology   • OTHER      Bladder laser surgery for cancer   • OTHER      Jaw surgery 35 years ago       Exam:     /64 (BP Location: Left arm, Patient Position: Sitting, BP Cuff Size: Adult long)   Pulse 72   Temp 36.6 °C (97.8 °F) (Temporal)   Resp 20   Ht 1.753 m (5' 9\")   Wt 77.3 kg (170 lb 6.4 oz)   SpO2 97%  Body mass index is 25.16 kg/m².    Hearing good.    Dentition unable to assess, wearing mask.   Alert, oriented in no acute distress.  Eye contact is good, speech goal directed, affect calm  Cardio: RRR.   Resp: CTAB.   Skin: There is a reddish rash in the perianal region with satellite lesions.        Assessment and Plan. The following treatment and monitoring plan is recommended:    1. Rash  - clotrimazole-betamethasone (LOTRISONE) 1-0.05 % Cream; Apply 1 Application topically 2 times a day for 7 days.  Dispense: 30 g; Refill: 0  - f/u 2 weeks to ensure resolution.     2. Healthcare maintenance  - see HPI. "     3. History of bladder cancer  - f/u urology.       Services suggested: No services needed at this time  Health Care Screening recommendations as per orders if indicated.  Referrals offered: PT/OT/Nutrition counseling/Behavioral Health/Smoking cessation as per orders if indicated.    Discussion today about general wellness and lifestyle habits:    · Prevent falls and reduce trip hazards; Cautioned about securing or removing rugs.  · Have a working fire alarm and carbon monoxide detector;   · Engage in regular physical activity and social activities       Follow-up: Return in about 4 weeks (around 4/20/2022), or if symptoms worsen or fail to improve.

## 2022-04-20 ENCOUNTER — OFFICE VISIT (OUTPATIENT)
Dept: MEDICAL GROUP | Facility: MEDICAL CENTER | Age: 67
End: 2022-04-20
Payer: MEDICARE

## 2022-04-20 VITALS
OXYGEN SATURATION: 96 % | RESPIRATION RATE: 18 BRPM | BODY MASS INDEX: 24.82 KG/M2 | HEART RATE: 71 BPM | WEIGHT: 167.55 LBS | TEMPERATURE: 97.7 F | SYSTOLIC BLOOD PRESSURE: 118 MMHG | HEIGHT: 69 IN | DIASTOLIC BLOOD PRESSURE: 72 MMHG

## 2022-04-20 DIAGNOSIS — R21 RASH: ICD-10-CM

## 2022-04-20 PROCEDURE — 99212 OFFICE O/P EST SF 10 MIN: CPT | Performed by: FAMILY MEDICINE

## 2022-04-20 RX ORDER — CLOTRIMAZOLE AND BETAMETHASONE DIPROPIONATE 10; .64 MG/G; MG/G
1 CREAM TOPICAL 2 TIMES DAILY
Qty: 45 G | Refills: 3 | Status: SHIPPED | OUTPATIENT
Start: 2022-04-20 | End: 2023-11-08

## 2022-04-20 ASSESSMENT — FIBROSIS 4 INDEX: FIB4 SCORE: 1.04

## 2022-04-20 NOTE — PROGRESS NOTES
"[facility]  Progress Note  Established Patient    Subjective:   Jareth Sunshine is a 67 y.o. male here today with a chief complaint of rash. The patient is alone.     Rash  Significant improvement on lotrisone.       Current Outpatient Medications on File Prior to Visit   Medication Sig Dispense Refill   • atorvastatin (LIPITOR) 10 MG Tab TAKE ONE TABLET BY MOUTH EVERY  tablet 4     No current facility-administered medications on file prior to visit.          Objective:     Vitals:    04/20/22 1019   BP: 118/72   BP Location: Left arm   Patient Position: Sitting   BP Cuff Size: Adult long   Pulse: 71   Resp: 18   Temp: 36.5 °C (97.7 °F)   TempSrc: Temporal   SpO2: 96%   Weight: 76 kg (167 lb 8.8 oz)   Height: 1.753 m (5' 9\")       Physical Exam:  General: alert in no apparent distress.   Skin: There is a reddish rash in the perianal region with some closed comedones, significantly improved.         Assessment and Plan:     1. Rash  Patient has been off Lotrisone for about 2 weeks now entirely.  Significantly improved from previously.  I asked the patient to resume Lotrisone twice daily for 3 days and then use it on an as-needed basis for 2 weeks after that.  If at the end of that 2-week period, the rash has not completely, 100% resolved I asked him to make an appointment with dermatology.  - Referral to Dermatology  - clotrimazole-betamethasone (LOTRISONE) 1-0.05 % Cream; Apply 1 Application topically 2 times a day. Use as needed for rash.  Dispense: 45 g; Refill: 3        Followup: Return in about 1 year (around 4/20/2023), or if symptoms worsen or fail to improve, for Wellness Visit, Long.         "

## 2022-06-21 ENCOUNTER — TELEPHONE (OUTPATIENT)
Dept: MEDICAL GROUP | Facility: LAB | Age: 67
End: 2022-06-21
Payer: MEDICARE

## 2022-06-21 NOTE — TELEPHONE ENCOUNTER
NEW PATIENT VISIT PRE-VISIT PLANNING    1.  [employer] Patient is checked in Patient Demographics?Yes    2.  Immunizations were updated in Epic using Reconcile Outside Information activity? Yes         3.  Is this appointment scheduled as a Hospital Follow-Up? No    4.  Patient is due for the following Health Maintenance Topics:   Health Maintenance Due   Topic Date Due   • Annual Wellness Visit  Never done     5.  Reviewed/Updated the following with patient:       •   Preferred Pharmacy? Yes        •   Preferred Lab? Yes        •   Preferred Communication? Yes        •   Allergies? Yes        •   Medications? Yes, abstract   6.  Updated Care Team?       •   DME Company (gait device, O2, CPAP, etc.)N/A        •   Other Specialists (eye doctor, derm, GYN, cardiology, endo, etc): Yes     7.  AHA (Puls8) form printed for Provider? Email sent to [facility] requesting form

## 2022-06-21 NOTE — TELEPHONE ENCOUNTER
Left message for patient to call back regarding pre-visit planning. Please transfer call to [phone].

## 2022-06-28 ENCOUNTER — OFFICE VISIT (OUTPATIENT)
Dept: MEDICAL GROUP | Facility: LAB | Age: 67
End: 2022-06-28
Payer: MEDICARE

## 2022-06-28 VITALS
WEIGHT: 164.8 LBS | HEART RATE: 74 BPM | OXYGEN SATURATION: 98 % | TEMPERATURE: 97 F | RESPIRATION RATE: 14 BRPM | SYSTOLIC BLOOD PRESSURE: 112 MMHG | HEIGHT: 69 IN | BODY MASS INDEX: 24.41 KG/M2 | DIASTOLIC BLOOD PRESSURE: 78 MMHG

## 2022-06-28 DIAGNOSIS — Z12.5 SCREENING FOR PROSTATE CANCER: ICD-10-CM

## 2022-06-28 DIAGNOSIS — Z85.51 HISTORY OF BLADDER CANCER: ICD-10-CM

## 2022-06-28 DIAGNOSIS — E78.5 DYSLIPIDEMIA: ICD-10-CM

## 2022-06-28 PROBLEM — Z00.00 HEALTHCARE MAINTENANCE: Status: RESOLVED | Noted: 2018-01-22 | Resolved: 2022-06-28

## 2022-06-28 PROCEDURE — 99213 OFFICE O/P EST LOW 20 MIN: CPT | Performed by: FAMILY MEDICINE

## 2022-06-28 ASSESSMENT — FIBROSIS 4 INDEX: FIB4 SCORE: 1.04

## 2022-06-28 NOTE — PROGRESS NOTES
"Jareth Sunshine is a 67 y.o. male here to establish care and discuss history of bladder cancer.    HPI:      Diagnosed with bladder cancer in , has been following with urology for yearly cystoscopy with reassuring results.  He is wondering if he needs to continue at this screening.    He is otherwise without acute concerns.  Continues on Lipitor for dyslipidemia.      Current medicines (including changes today)  Current Outpatient Medications   Medication Sig Dispense Refill   • clotrimazole-betamethasone (LOTRISONE) 1-0.05 % Cream Apply 1 Application topically 2 times a day. Use as needed for rash. 45 g 3   • atorvastatin (LIPITOR) 10 MG Tab TAKE ONE TABLET BY MOUTH EVERY  tablet 4     No current facility-administered medications for this visit.     He  has a past medical history of Cancer (HCC), Hyperlipidemia, and Renal disorder.  He  has a past surgical history that includes other; other; cystoscopy (N/A, 2019); and trans urethral resection bladder (N/A, 2019).  Social History     Tobacco Use   • Smoking status: Never Smoker   • Smokeless tobacco: Never Used   Vaping Use   • Vaping Use: Never used   Substance Use Topics   • Alcohol use: Yes     Comment: 0-2 per month   • Drug use: No     Social History     Social History Narrative   • Not on file     Family History   Problem Relation Age of Onset   • Arthritis Mother    • No Known Problems Father    • Diabetes Sister      Family Status   Relation Name Status   • Mo     • Fa     • Sis  Alive       ROS  See HPI     Objective:     Physical Exam:  /78 (BP Location: Left arm, Patient Position: Sitting, BP Cuff Size: Adult)   Pulse 74   Temp 36.1 °C (97 °F)   Resp 14   Ht 1.753 m (5' 9\")   Wt 74.8 kg (164 lb 12.8 oz)   SpO2 98%  Body mass index is 24.34 kg/m².  Constitutional: Alert. Well appearing. No distress.  Skin: Warm, dry, good turgor, no visible rashes.  Eye: Equal, round and reactive to light, conjunctiva " clear, lids normal.  Neck: Trachea midline, no masses, no thyromegaly. No cervical or supraclavicular lymphadenopathy.  Respiratory: Normal effort. Lungs are clear to auscultation bilaterally.  Cardiovascular: Regular rate and rhythm. Normal S1/S2. No murmurs, rubs or gallops.   Neuro: Moves all four extremities. No facial droop.  Psych: Answers questions appropriately. Normal affect and mood.    Assessment and Plan:     1. History of bladder cancer  Treated in  2012.  Continue to follow-up with urology, discussed that I thought it was fine to stop yearly cystoscopy if they do not think he needs further surveillance.  - CBC WITH DIFFERENTIAL; Future  - Comp Metabolic Panel; Future    2. Dyslipidemia  Recheck lipids, continue atorvastatin.  - Lipid Profile; Future    3. Screening for prostate cancer  - PROSTATE SPECIFIC AG SCREENING; Future    Follow up: Return in about 6 months (around 12/28/2022).         PLEASE NOTE: This note was created using voice recognition software.

## 2022-08-09 RX ORDER — ATORVASTATIN CALCIUM 10 MG/1
TABLET, FILM COATED ORAL
Qty: 100 TABLET | Refills: 4 | Status: SHIPPED | OUTPATIENT
Start: 2022-08-09 | End: 2023-09-25

## 2022-11-14 ENCOUNTER — DOCUMENTATION (OUTPATIENT)
Dept: HEALTH INFORMATION MANAGEMENT | Facility: OTHER | Age: 67
End: 2022-11-14
Payer: MEDICARE

## 2023-03-14 ENCOUNTER — TELEPHONE (OUTPATIENT)
Dept: MEDICAL GROUP | Facility: LAB | Age: 68
End: 2023-03-14
Payer: MEDICARE

## 2023-03-14 NOTE — TELEPHONE ENCOUNTER
ANNUAL WELLNESS VISIT PRE-VISIT PLANNING    1.  Reviewed notes from the last office visit: Yes    2.  If any orders were ordered or intended to be done prior to visit (i.e. 6 mos follow-up), do we have results/consult notes or has patient scheduled?          Labs - Labs ordered, but not to be completed until 3/16/23 appt.  Note: If patient appointment is for lab review and patient did not complete labs, check with provider if OK to reschedule patient until labs completed.         Imaging - Imaging was not ordered at last office visit.         Referrals - No referrals were ordered at last office visit.    3.  Immunizations were updated in Epic using Reconcile Outside Information activity? Yes    4.  Patient is due for the following Health Maintenance Topics:   Health Maintenance Due   Topic Date Due    Annual Wellness Visit  Never done   5.  Reviewed/Updated the following with patient:          Preferred Pharmacy? Yes           Preferred Lab? Yes           Preferred Communication? Yes           Allergies? Yes           Medications? Yes, abstract     6.  Care Team Updated:          DME Company (gait device, O2, CPAP, etc.): Yes          Other Specialists (eye doctor, derm, GYN, cardiology, endo, etc): Yes     7.  Patient was advised: “This is a free wellness visit. The provider will screen for medical conditions to help you stay healthy. If you have other concerns to address you may be asked to discuss these at a separate visit or there may be an additional fee.”     8.  AHA (Puls8) form printed for Provider? N/A

## 2023-03-14 NOTE — TELEPHONE ENCOUNTER
Left message for patient to call back regarding pre-visit planning. Please transfer call to [phone].  Voice mail not set up to [facility].

## 2023-03-16 ENCOUNTER — HOSPITAL ENCOUNTER (OUTPATIENT)
Dept: LAB | Facility: MEDICAL CENTER | Age: 68
End: 2023-03-16
Attending: FAMILY MEDICINE
Payer: MEDICARE

## 2023-03-16 DIAGNOSIS — Z12.5 SCREENING FOR PROSTATE CANCER: ICD-10-CM

## 2023-03-16 DIAGNOSIS — E78.5 DYSLIPIDEMIA: ICD-10-CM

## 2023-03-16 DIAGNOSIS — Z85.51 HISTORY OF BLADDER CANCER: ICD-10-CM

## 2023-03-16 LAB
ALBUMIN SERPL BCP-MCNC: 3.9 G/DL (ref 3.2–4.9)
ALBUMIN/GLOB SERPL: 1.3 G/DL
ALP SERPL-CCNC: 87 U/L (ref 30–99)
ALT SERPL-CCNC: 21 U/L (ref 2–50)
ANION GAP SERPL CALC-SCNC: 9 MMOL/L (ref 7–16)
AST SERPL-CCNC: 19 U/L (ref 12–45)
BASOPHILS # BLD AUTO: 0.9 % (ref 0–1.8)
BASOPHILS # BLD: 0.05 K/UL (ref 0–0.12)
BILIRUB SERPL-MCNC: 0.8 MG/DL (ref 0.1–1.5)
BUN SERPL-MCNC: 13 MG/DL (ref 8–22)
CALCIUM ALBUM COR SERPL-MCNC: 9.1 MG/DL (ref 8.5–10.5)
CALCIUM SERPL-MCNC: 9 MG/DL (ref 8.5–10.5)
CHLORIDE SERPL-SCNC: 104 MMOL/L (ref 96–112)
CHOLEST SERPL-MCNC: 145 MG/DL (ref 100–199)
CO2 SERPL-SCNC: 26 MMOL/L (ref 20–33)
CREAT SERPL-MCNC: 0.98 MG/DL (ref 0.5–1.4)
EOSINOPHIL # BLD AUTO: 0.04 K/UL (ref 0–0.51)
EOSINOPHIL NFR BLD: 0.8 % (ref 0–6.9)
ERYTHROCYTE [DISTWIDTH] IN BLOOD BY AUTOMATED COUNT: 43.5 FL (ref 35.9–50)
FASTING STATUS PATIENT QL REPORTED: NORMAL
GFR SERPLBLD CREATININE-BSD FMLA CKD-EPI: 83 ML/MIN/1.73 M 2
GLOBULIN SER CALC-MCNC: 3.1 G/DL (ref 1.9–3.5)
GLUCOSE SERPL-MCNC: 90 MG/DL (ref 65–99)
HCT VFR BLD AUTO: 48.9 % (ref 42–52)
HDLC SERPL-MCNC: 47 MG/DL
HGB BLD-MCNC: 16.3 G/DL (ref 14–18)
IMM GRANULOCYTES # BLD AUTO: 0.02 K/UL (ref 0–0.11)
IMM GRANULOCYTES NFR BLD AUTO: 0.4 % (ref 0–0.9)
LDLC SERPL CALC-MCNC: 77 MG/DL
LYMPHOCYTES # BLD AUTO: 1.03 K/UL (ref 1–4.8)
LYMPHOCYTES NFR BLD: 19.5 % (ref 22–41)
MCH RBC QN AUTO: 31.5 PG (ref 27–33)
MCHC RBC AUTO-ENTMCNC: 33.3 G/DL (ref 33.7–35.3)
MCV RBC AUTO: 94.4 FL (ref 81.4–97.8)
MONOCYTES # BLD AUTO: 0.58 K/UL (ref 0–0.85)
MONOCYTES NFR BLD AUTO: 11 % (ref 0–13.4)
NEUTROPHILS # BLD AUTO: 3.55 K/UL (ref 1.82–7.42)
NEUTROPHILS NFR BLD: 67.4 % (ref 44–72)
NRBC # BLD AUTO: 0 K/UL
NRBC BLD-RTO: 0 /100 WBC
PLATELET # BLD AUTO: 286 K/UL (ref 164–446)
PMV BLD AUTO: 9.4 FL (ref 9–12.9)
POTASSIUM SERPL-SCNC: 4.4 MMOL/L (ref 3.6–5.5)
PROT SERPL-MCNC: 7 G/DL (ref 6–8.2)
PSA SERPL-MCNC: 1.08 NG/ML (ref 0–4)
RBC # BLD AUTO: 5.18 M/UL (ref 4.7–6.1)
SODIUM SERPL-SCNC: 139 MMOL/L (ref 135–145)
TRIGL SERPL-MCNC: 107 MG/DL (ref 0–149)
WBC # BLD AUTO: 5.3 K/UL (ref 4.8–10.8)

## 2023-03-16 PROCEDURE — 80053 COMPREHEN METABOLIC PANEL: CPT

## 2023-03-16 PROCEDURE — 36415 COLL VENOUS BLD VENIPUNCTURE: CPT

## 2023-03-16 PROCEDURE — 85025 COMPLETE CBC W/AUTO DIFF WBC: CPT

## 2023-03-16 PROCEDURE — 84153 ASSAY OF PSA TOTAL: CPT

## 2023-03-16 PROCEDURE — 80061 LIPID PANEL: CPT

## 2023-03-21 SDOH — ECONOMIC STABILITY: INCOME INSECURITY: HOW HARD IS IT FOR YOU TO PAY FOR THE VERY BASICS LIKE FOOD, HOUSING, MEDICAL CARE, AND HEATING?: NOT HARD AT ALL

## 2023-03-21 SDOH — ECONOMIC STABILITY: TRANSPORTATION INSECURITY
IN THE PAST 12 MONTHS, HAS LACK OF RELIABLE TRANSPORTATION KEPT YOU FROM MEDICAL APPOINTMENTS, MEETINGS, WORK OR FROM GETTING THINGS NEEDED FOR DAILY LIVING?: NO

## 2023-03-21 SDOH — ECONOMIC STABILITY: TRANSPORTATION INSECURITY
IN THE PAST 12 MONTHS, HAS THE LACK OF TRANSPORTATION KEPT YOU FROM MEDICAL APPOINTMENTS OR FROM GETTING MEDICATIONS?: NO

## 2023-03-21 SDOH — HEALTH STABILITY: MENTAL HEALTH
STRESS IS WHEN SOMEONE FEELS TENSE, NERVOUS, ANXIOUS, OR CAN'T SLEEP AT NIGHT BECAUSE THEIR MIND IS TROUBLED. HOW STRESSED ARE YOU?: NOT AT ALL

## 2023-03-21 SDOH — ECONOMIC STABILITY: FOOD INSECURITY: WITHIN THE PAST 12 MONTHS, YOU WORRIED THAT YOUR FOOD WOULD RUN OUT BEFORE YOU GOT MONEY TO BUY MORE.: NEVER TRUE

## 2023-03-21 SDOH — ECONOMIC STABILITY: HOUSING INSECURITY
IN THE LAST 12 MONTHS, WAS THERE A TIME WHEN YOU DID NOT HAVE A STEADY PLACE TO SLEEP OR SLEPT IN A SHELTER (INCLUDING NOW)?: NO

## 2023-03-21 SDOH — HEALTH STABILITY: PHYSICAL HEALTH: ON AVERAGE, HOW MANY DAYS PER WEEK DO YOU ENGAGE IN MODERATE TO STRENUOUS EXERCISE (LIKE A BRISK WALK)?: 5 DAYS

## 2023-03-21 SDOH — ECONOMIC STABILITY: FOOD INSECURITY: WITHIN THE PAST 12 MONTHS, THE FOOD YOU BOUGHT JUST DIDN'T LAST AND YOU DIDN'T HAVE MONEY TO GET MORE.: NEVER TRUE

## 2023-03-21 SDOH — ECONOMIC STABILITY: INCOME INSECURITY: IN THE LAST 12 MONTHS, WAS THERE A TIME WHEN YOU WERE NOT ABLE TO PAY THE MORTGAGE OR RENT ON TIME?: NO

## 2023-03-21 SDOH — ECONOMIC STABILITY: HOUSING INSECURITY: IN THE LAST 12 MONTHS, HOW MANY PLACES HAVE YOU LIVED?: 1

## 2023-03-21 SDOH — HEALTH STABILITY: PHYSICAL HEALTH: ON AVERAGE, HOW MANY MINUTES DO YOU ENGAGE IN EXERCISE AT THIS LEVEL?: 60 MIN

## 2023-03-21 SDOH — ECONOMIC STABILITY: TRANSPORTATION INSECURITY
IN THE PAST 12 MONTHS, HAS LACK OF TRANSPORTATION KEPT YOU FROM MEETINGS, WORK, OR FROM GETTING THINGS NEEDED FOR DAILY LIVING?: NO

## 2023-03-21 ASSESSMENT — SOCIAL DETERMINANTS OF HEALTH (SDOH)
HOW OFTEN DO YOU GET TOGETHER WITH FRIENDS OR RELATIVES?: TWICE A WEEK
HOW OFTEN DO YOU GET TOGETHER WITH FRIENDS OR RELATIVES?: TWICE A WEEK
HOW OFTEN DO YOU HAVE SIX OR MORE DRINKS ON ONE OCCASION: NEVER
WITHIN THE PAST 12 MONTHS, YOU WORRIED THAT YOUR FOOD WOULD RUN OUT BEFORE YOU GOT THE MONEY TO BUY MORE: NEVER TRUE
HOW OFTEN DO YOU ATTENT MEETINGS OF THE CLUB OR ORGANIZATION YOU BELONG TO?: NEVER
HOW HARD IS IT FOR YOU TO PAY FOR THE VERY BASICS LIKE FOOD, HOUSING, MEDICAL CARE, AND HEATING?: NOT HARD AT ALL
HOW OFTEN DO YOU ATTENT MEETINGS OF THE CLUB OR ORGANIZATION YOU BELONG TO?: NEVER
DO YOU BELONG TO ANY CLUBS OR ORGANIZATIONS SUCH AS CHURCH GROUPS UNIONS, FRATERNAL OR ATHLETIC GROUPS, OR SCHOOL GROUPS?: NO
HOW OFTEN DO YOU ATTEND CHURCH OR RELIGIOUS SERVICES?: NEVER
IN A TYPICAL WEEK, HOW MANY TIMES DO YOU TALK ON THE PHONE WITH FAMILY, FRIENDS, OR NEIGHBORS?: THREE TIMES A WEEK
IN A TYPICAL WEEK, HOW MANY TIMES DO YOU TALK ON THE PHONE WITH FAMILY, FRIENDS, OR NEIGHBORS?: THREE TIMES A WEEK
HOW OFTEN DO YOU HAVE A DRINK CONTAINING ALCOHOL: MONTHLY OR LESS
HOW MANY DRINKS CONTAINING ALCOHOL DO YOU HAVE ON A TYPICAL DAY WHEN YOU ARE DRINKING: 1 OR 2
DO YOU BELONG TO ANY CLUBS OR ORGANIZATIONS SUCH AS CHURCH GROUPS UNIONS, FRATERNAL OR ATHLETIC GROUPS, OR SCHOOL GROUPS?: NO
HOW OFTEN DO YOU ATTEND CHURCH OR RELIGIOUS SERVICES?: NEVER

## 2023-03-21 ASSESSMENT — LIFESTYLE VARIABLES
AUDIT-C TOTAL SCORE: 1
HOW MANY STANDARD DRINKS CONTAINING ALCOHOL DO YOU HAVE ON A TYPICAL DAY: 1 OR 2
HOW OFTEN DO YOU HAVE SIX OR MORE DRINKS ON ONE OCCASION: NEVER
SKIP TO QUESTIONS 9-10: 1
HOW OFTEN DO YOU HAVE A DRINK CONTAINING ALCOHOL: MONTHLY OR LESS

## 2023-03-23 ENCOUNTER — OFFICE VISIT (OUTPATIENT)
Dept: MEDICAL GROUP | Facility: LAB | Age: 68
End: 2023-03-23
Payer: MEDICARE

## 2023-03-23 VITALS
HEART RATE: 76 BPM | TEMPERATURE: 97.1 F | HEIGHT: 69 IN | WEIGHT: 167.2 LBS | SYSTOLIC BLOOD PRESSURE: 118 MMHG | DIASTOLIC BLOOD PRESSURE: 78 MMHG | OXYGEN SATURATION: 97 % | BODY MASS INDEX: 24.76 KG/M2 | RESPIRATION RATE: 12 BRPM

## 2023-03-23 DIAGNOSIS — L57.0 ACTINIC KERATOSES: ICD-10-CM

## 2023-03-23 DIAGNOSIS — Z00.00 MEDICARE ANNUAL WELLNESS VISIT, SUBSEQUENT: ICD-10-CM

## 2023-03-23 DIAGNOSIS — E78.5 DYSLIPIDEMIA: ICD-10-CM

## 2023-03-23 DIAGNOSIS — Z85.51 HISTORY OF BLADDER CANCER: ICD-10-CM

## 2023-03-23 PROBLEM — R21 RASH: Status: RESOLVED | Noted: 2022-03-23 | Resolved: 2023-03-23

## 2023-03-23 PROBLEM — D72.819 LEUKOPENIA: Status: RESOLVED | Noted: 2020-03-19 | Resolved: 2023-03-23

## 2023-03-23 PROCEDURE — 17003 DESTRUCT PREMALG LES 2-14: CPT | Performed by: FAMILY MEDICINE

## 2023-03-23 PROCEDURE — G0439 PPPS, SUBSEQ VISIT: HCPCS | Mod: 25 | Performed by: FAMILY MEDICINE

## 2023-03-23 PROCEDURE — 17000 DESTRUCT PREMALG LESION: CPT | Performed by: FAMILY MEDICINE

## 2023-03-23 ASSESSMENT — PATIENT HEALTH QUESTIONNAIRE - PHQ9: CLINICAL INTERPRETATION OF PHQ2 SCORE: 0

## 2023-03-23 ASSESSMENT — ACTIVITIES OF DAILY LIVING (ADL): BATHING_REQUIRES_ASSISTANCE: 0

## 2023-03-23 ASSESSMENT — FIBROSIS 4 INDEX: FIB4 SCORE: 1

## 2023-03-23 ASSESSMENT — ENCOUNTER SYMPTOMS: GENERAL WELL-BEING: GOOD

## 2023-03-23 NOTE — PROCEDURES
Mandeep Meneses - Pre-malignant    Date/Time: 3/23/2023 9:59 AM  Performed by: Chandana Hummel M.D.  Authorized by: Chandana Hummel M.D.     Number of Lesions: 4  Lesion 1:     Body area: head/neck    Head/neck location: scalp    Malignancy: malignancy unknown      Destruction method: cryotherapy      Cryo cycles: 3  Lesion 2:     Body area: head/neck    Head/neck location: scalp    Malignancy: malignancy unknown      Destruction method: cryotherapy      Cryo cycles: 3  Lesion 3:     Body area: head/neck    Head/neck location: scalp    Destruction method: cryotherapy      Cryo cycles: 3  Lesion 4:     Body area: head/neck    Head/neck location: scalp    Malignancy: malignancy unknown      Destruction method: cryotherapy      Cryo cycles: 3

## 2023-03-23 NOTE — PROGRESS NOTES
Chief Complaint   Patient presents with    Annual Exam     SCP AWV       HPI:  Jareth is a 67 y.o. here for Medicare Annual Wellness Visit      Patient Active Problem List    Diagnosis Date Noted    History of bladder cancer 12/21/2021    Dyslipidemia 01/22/2018       Current Outpatient Medications   Medication Sig Dispense Refill    atorvastatin (LIPITOR) 10 MG Tab TAKE ONE TABLET BY MOUTH EVERY  Tablet 4    clotrimazole-betamethasone (LOTRISONE) 1-0.05 % Cream Apply 1 Application topically 2 times a day. Use as needed for rash. 45 g 3     No current facility-administered medications for this visit.        Patient is taking medications as noted in medication list.  Current supplements as per medication list.     Allergies: Patient has no known allergies.    Current social contact/activities: Frequently sees family      Is patient current with immunizations? Yes.    He  reports that he has never smoked. He has never used smokeless tobacco. He reports current alcohol use. He reports that he does not use drugs.  Counseling given: Not Answered      ROS:    Gait: Uses no assistive device   Ostomy: No   Other tubes: No   Amputations: No   Chronic oxygen use No   Last eye exam 2022   Wears hearing aids: No   : Denies any urinary leakage during the last 6 months    Screening:    Depression Screening  Little interest or pleasure in doing things?  0 - not at all  Feeling down, depressed, or hopeless? 0 - not at all  Patient Health Questionnaire Score: 0    If depressive symptoms identified deferred to follow up visit unless specifically addressed in assessment and plan.    Interpretation of PHQ-9 Total Score   Score Severity   1-4 No Depression   5-9 Mild Depression   10-14 Moderate Depression   15-19 Moderately Severe Depression   20-27 Severe Depression    Screening for Cognitive Impairment  Three Minute Recall (daughter, heaven, mountain)  3/3    Josue clock face with all 12 numbers and set the hands to show 10  past 11.  Yes    If cognitive concerns identified, deferred for follow up unless specifically addressed in assessment and plan.    Fall Risk Assessment  Has the patient had two or more falls in the last year or any fall with injury in the last year?  No  If fall risk identified, deferred for follow up unless specifically addressed in assessment and plan.    Safety Assessment  Throw rugs on floor.  Yes  Handrails on all stairs.  Yes  Good lighting in all hallways.  Yes  Difficulty hearing.  No  Patient counseled about all safety risks that were identified.    Functional Assessment ADLs  Are there any barriers preventing you from cooking for yourself or meeting nutritional needs?  No.    Are there any barriers preventing you from driving safely or obtaining transportation?  No.    Are there any barriers preventing you from using a telephone or calling for help?  No.    Are there any barriers preventing you from shopping?  No.    Are there any barriers preventing you from taking care of your own finances?  No.    Are there any barriers preventing you from managing your medications?  No.    Are there any barriers preventing you from showering, bathing or dressing yourself?  No.    Are you currently engaging in any exercise or physical activity?  Yes.  Gym 2-3x weekly, walk 3-4 miles every other day, skiing, hiking   What is your perception of your health?  Good.    Advance Care Planning  Do you have an Advance Directive, Living Will, Durable Power of , or POLST? Yes  Advance Directive       is not on file - instructed patient to bring in a copy to scan into their chart    Health Maintenance Summary            Overdue - Annual Wellness Visit (Every 366 Days) Overdue - never done      No completion history exists for this topic.              COLORECTAL CANCER SCREENING (COLONOSCOPY - Every 8 Years) Next due on 5/31/2026 05/31/2018  REFERRAL TO GI FOR COLONOSCOPY              IMM DTaP/Tdap/Td Vaccine (2 - Td  or Tdap) Next due on 4/23/2028 04/23/2018  Imm Admin: Tdap Vaccine              HEPATITIS C SCREENING  Completed      05/07/2018  HEP C VIRUS ANTIBODY              IMM PNEUMOCOCCAL VACCINE: 65+ Years (Series Information) Completed      03/19/2020  Imm Admin: Pneumococcal polysaccharide vaccine (PPSV-23)    04/23/2018  Imm Admin: Pneumococcal Conjugate Vaccine (Prevnar/PCV-13)              IMM ZOSTER VACCINES (Series Information) Completed      01/06/2021  Imm Admin: Zoster Vaccine Recombinant (RZV) (SHINGRIX)    10/16/2020  Imm Admin: Zoster Vaccine Recombinant (RZV) (SHINGRIX)              IMM INFLUENZA (Series Information) Completed      09/27/2022  Imm Admin: Influenza Vaccine Adult HD    12/30/2021  Imm Admin: Influenza Vaccine Adult HD    10/22/2020  Imm Admin: Influenza Vaccine Adult HD    03/19/2020  Imm Admin: Influenza Vaccine Adult HD    01/22/2018  Imm Admin: Influenza Vaccine Quad Inj (Pf)              COVID-19 Vaccine (Series Information) Completed      09/30/2022  Imm Admin: PFIZER BIVALENT BOOSTER SARS-COV-2 VACCINE (12+)    05/23/2022  Imm Admin: PFIZER EDMOND CAP SARS-COV-2 VACCINATION (12+)    09/30/2021  Imm Admin: PFIZER PURPLE CAP SARS-COV-2 VACCINATION (12+)    03/21/2021  Imm Admin: PFIZER PURPLE CAP SARS-COV-2 VACCINATION (12+)    02/28/2021  Imm Admin: PFIZER PURPLE CAP SARS-COV-2 VACCINATION (12+)              IMM HEP B VACCINE (Series Information) Aged Out      No completion history exists for this topic.              IMM MENINGOCOCCAL ACWY VACCINE (Series Information) Aged Out      No completion history exists for this topic.                    Patient Care Team:  Chandana Hummel M.D. as PCP - General (Family Medicine)  Jim Murphy M.D. as Consulting Physician (Urology)  Zahar Murphy C.N.A. as [facility]     Social History     Tobacco Use    Smoking status: Never    Smokeless tobacco: Never   Vaping Use    Vaping Use: Never used   Substance Use  "Topics    Alcohol use: Yes     Comment: 0-2 per month    Drug use: No     Family History   Problem Relation Age of Onset    Arthritis Mother     No Known Problems Father     Diabetes Sister      He  has a past medical history of Cancer (HCC), Hyperlipidemia, and Renal disorder.   Past Surgical History:   Procedure Laterality Date    CYSTOSCOPY N/A 6/6/2019    Procedure: CYSTOSCOPY;  Surgeon: Jim Murphy M.D.;  Location: SURGERY [facility];  Service: Urology    TRANS URETHRAL RESECTION BLADDER N/A 6/6/2019    Procedure: TRANS URETHRAL RESECTION BLADDER-  TUMOR;  Surgeon: Jmi Murphy M.D.;  Location: SURGERY [facility];  Service: Urology    OTHER      Bladder laser surgery for cancer    OTHER      Jaw surgery 35 years ago       Exam:   /78 (BP Location: Right arm, Patient Position: Sitting, BP Cuff Size: Adult)   Pulse 76   Temp 36.2 °C (97.1 °F)   Resp 12   Ht 1.753 m (5' 9\")   Wt 75.8 kg (167 lb 3.2 oz)   SpO2 97%  Body mass index is 24.69 kg/m².    Hearing good.    Dentition good  Alert, oriented in no acute distress.  Eye contact is good, speech goal directed, affect calm  CV: RRR  Lungs: CTAB  Ext: No edema  Skin: To the scalp there are 4 erythematous, scaly patches.    Assessment and Plan. The following treatment and monitoring plan is recommended:    1. Medicare annual wellness visit, subsequent    2. Dyslipidemia  Lipids at goal, continue Lipitor    3. History of bladder cancer  Was following with urology but no further surveillance needed, this was treated in 2012.  He will return to see urology if he develops any new symptoms.    4. Actinic keratoses  To the scalp, treated with cryotherapy today.  Encouraged to schedule yearly skin exam with dermatology.  - Mandeep Destfide - Pre-malignant       Services suggested: No services needed at this time  Health Care Screening recommendations as per orders if indicated.  Referrals offered: PT/OT/Nutrition counseling/Behavioral Health/Smoking " cessation as per orders if indicated.    Discussion today about general wellness and lifestyle habits:    Prevent falls and reduce trip hazards; Cautioned about securing or removing rugs.  Have a working fire alarm and carbon monoxide detector;   Engage in regular physical activity and social activities     Follow-up: Return in about 6 months (around 9/23/2023).

## 2023-05-03 ENCOUNTER — TELEPHONE (OUTPATIENT)
Dept: HEALTH INFORMATION MANAGEMENT | Facility: OTHER | Age: 68
End: 2023-05-03
Payer: MEDICARE

## 2023-06-28 ENCOUNTER — DOCUMENTATION (OUTPATIENT)
Dept: HEALTH INFORMATION MANAGEMENT | Facility: OTHER | Age: 68
End: 2023-06-28
Payer: MEDICARE

## 2023-07-26 ENCOUNTER — OFFICE VISIT (OUTPATIENT)
Dept: URGENT CARE | Facility: CLINIC | Age: 68
End: 2023-07-26
Payer: MEDICARE

## 2023-07-26 ENCOUNTER — APPOINTMENT (OUTPATIENT)
Dept: RADIOLOGY | Facility: IMAGING CENTER | Age: 68
End: 2023-07-26
Attending: NURSE PRACTITIONER
Payer: MEDICARE

## 2023-07-26 VITALS
DIASTOLIC BLOOD PRESSURE: 82 MMHG | WEIGHT: 165 LBS | HEIGHT: 69 IN | HEART RATE: 70 BPM | BODY MASS INDEX: 24.44 KG/M2 | TEMPERATURE: 97.3 F | RESPIRATION RATE: 14 BRPM | OXYGEN SATURATION: 97 % | SYSTOLIC BLOOD PRESSURE: 128 MMHG

## 2023-07-26 DIAGNOSIS — S69.91XA INJURY OF RIGHT THUMB, INITIAL ENCOUNTER: ICD-10-CM

## 2023-07-26 DIAGNOSIS — L08.9 INFECTION OF FINGER: ICD-10-CM

## 2023-07-26 PROCEDURE — 3079F DIAST BP 80-89 MM HG: CPT | Performed by: NURSE PRACTITIONER

## 2023-07-26 PROCEDURE — 99214 OFFICE O/P EST MOD 30 MIN: CPT | Performed by: NURSE PRACTITIONER

## 2023-07-26 PROCEDURE — 3074F SYST BP LT 130 MM HG: CPT | Performed by: NURSE PRACTITIONER

## 2023-07-26 PROCEDURE — 73140 X-RAY EXAM OF FINGER(S): CPT | Mod: TC,FY,RT | Performed by: NURSE PRACTITIONER

## 2023-07-26 RX ORDER — AMOXICILLIN 875 MG/1
TABLET, COATED ORAL
COMMUNITY
End: 2023-07-26

## 2023-07-26 RX ORDER — HYDROCODONE BITARTRATE AND ACETAMINOPHEN 5; 325 MG/1; MG/1
TABLET ORAL
COMMUNITY
End: 2023-11-08

## 2023-07-26 RX ORDER — CEPHALEXIN 500 MG/1
500 CAPSULE ORAL 2 TIMES DAILY
Qty: 10 CAPSULE | Refills: 0 | Status: SHIPPED
Start: 2023-07-26 | End: 2023-07-29

## 2023-07-26 RX ORDER — PHENAZOPYRIDINE HYDROCHLORIDE 200 MG/1
TABLET, FILM COATED ORAL
COMMUNITY
End: 2023-11-08

## 2023-07-26 ASSESSMENT — FIBROSIS 4 INDEX: FIB4 SCORE: 1.01

## 2023-07-26 NOTE — PROGRESS NOTES
Patient has consented to treatment and for use of patient information for treatment and billing purposes.    Date: 07/26/23     Arrival Mode: Private Vehicle    Chief Complaint:    Chief Complaint   Patient presents with    Finger Injury     X4days, Right thumb, smacked thumb on cabinet and states it keeps swelling up, states it throbs and wakes him up         History of Present Illness: 68 y.o.  male presents to clinic with 4-day history of right thumb pain.  Patient states 4 days he hit his thumb on a cabinet in his kitchen.  He states he did not hit it significantly hard although it has been painful over the past 4 days.  He states in the melanite it does throb and wake him up.  His wife and himself did try to perform a trepanation he states that there is no blood drainage.  He presents to clinic today to be evaluated for trepanation.  He denies any numbness or tingling.  He denies any fevers body aches.      ROS:    As stated in HPI     Pertinent Medical History:  Past Medical History:   Diagnosis Date    Cancer (HCC)     Bladder, s/p treatment    Hyperlipidemia     Renal disorder     kidney stone        Pertinent Surgical History:  Past Surgical History:   Procedure Laterality Date    CYSTOSCOPY N/A 6/6/2019    Procedure: CYSTOSCOPY;  Surgeon: Jim Murphy M.D.;  Location: SURGERY [facility];  Service: Urology    TRANS URETHRAL RESECTION BLADDER N/A 6/6/2019    Procedure: TRANS URETHRAL RESECTION BLADDER-  TUMOR;  Surgeon: Jim Murphy M.D.;  Location: SURGERY [facility];  Service: Urology    OTHER      Bladder laser surgery for cancer    OTHER      Jaw surgery 35 years ago        Pertinent Medications:    Current Outpatient Medications on File Prior to Visit   Medication Sig Dispense Refill    atorvastatin (LIPITOR) 10 MG Tab TAKE ONE TABLET BY MOUTH EVERY  Tablet 4    HYDROcodone-acetaminophen (NORCO) 5-325 MG Tab per tablet  (Patient not taking: Reported on 7/26/2023)       phenazopyridine (PYRIDIUM) 200 MG Tab  (Patient not taking: Reported on 7/26/2023)      clotrimazole-betamethasone (LOTRISONE) 1-0.05 % Cream Apply 1 Application topically 2 times a day. Use as needed for rash. (Patient not taking: Reported on 7/26/2023) 45 g 3     No current facility-administered medications on file prior to visit.        Allergies:    Patient has no known allergies.     Social History:  Social History     Tobacco Use    Smoking status: Never    Smokeless tobacco: Never   Vaping Use    Vaping Use: Never used   Substance Use Topics    Alcohol use: Yes     Comment: 0-2 per month    Drug use: No        No LMP for male patient.           Physical Exam:    Vitals:    07/26/23 1036   BP: 128/82   Pulse: 70   Resp: 14   Temp: 36.3 °C (97.3 °F)   SpO2: 97%             Physical Exam  Constitutional:       Appearance: Normal appearance.   HENT:      Head: Normocephalic and atraumatic.   Musculoskeletal:      Right wrist: Normal.      Right hand: Tenderness present. No bony tenderness. Normal strength. Normal sensation. There is no disruption of two-point discrimination. Normal capillary refill. Normal pulse.      Left hand: Normal.        Hands:    Neurological:      Mental Status: He is alert.                  Diagnostics:    DX-FINGER(S) 2+ RIGHT    Result Date: 7/26/2023 7/26/2023 10:42 AM HISTORY/REASON FOR EXAM:  Pain/Deformity Following Trauma; thumb, injury 4 days ago Right first digit pain. TECHNIQUE/EXAM DESCRIPTION AND NUMBER OF VIEWS:  3 views of the RIGHT fingers. COMPARISON: None FINDINGS: No acute fracture or dislocation. 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     No acute osseous abnormality.        Diagnostics interpreted by myself.  Do agree with radiology read.    Medical Decision making and clinic course :  I personally reviewed prior external notes and test results pertinent to today's visit. Pt is clinically stable at today's acute urgent care visit.  No acute distress noted.  Appropriate for outpatient care at this time. Shared decision-making was utilized with patient for treatment plan.    Pleasant nontoxic-appearing 68-year-old male presenting clinic with 4-day history of acute right thumb injury.  Did obtain x-ray fortunately negative.  Discussed there is no subungual hematoma due to this it cannot be drained.  Due to surrounding redness swelling and proximal red streaking did place patient on Keflex.  Advised ibuprofen and ice for pain.  Patient is placed in a thumb splint to protect.  Return to clinic in 10 days if there is no improvement.      The patient remained stable during the urgent care visit.    Plan:    Medication discussed included indication for use and the potential  benefits and side effects.         1. Injury of right thumb, initial encounter    - DX-FINGER(S) 2+ RIGHT; Future    2. Infection of finger    - cephALEXin (KEFLEX) 500 MG Cap; Take 1 Capsule by mouth 2 times a day for 5 days.  Dispense: 10 Capsule; Refill: 0      All of the patient's questions were answered to their satisfaction at the time of discharge.    Follow up:    Recommended f/u in  10 days for bones pain, 3 days for infection  if there is no improvement.    Patient was encouraged to monitor symptoms closely. Those signs and symptoms which would warrant concern and mandate seeking a higher level of service through the emergency department discussed at length and included in discharge papers.  Patient stated agreement and understanding of this plan of care.    Disposition:  Home in stable condition       Voice Recognition Disclaimer:  Portions of this document were created using voice recognition software. The software does have a chance of producing errors of grammar and possibly content. I have made every reasonable attempt to correct obvious errors, but there may be errors of grammar and possibly content that I did not discover before finalizing the documentation.    ABIGAIL Alcantara.

## 2023-07-29 ENCOUNTER — APPOINTMENT (OUTPATIENT)
Dept: RADIOLOGY | Facility: IMAGING CENTER | Age: 68
End: 2023-07-29
Payer: MEDICARE

## 2023-07-29 ENCOUNTER — OFFICE VISIT (OUTPATIENT)
Dept: URGENT CARE | Facility: CLINIC | Age: 68
End: 2023-07-29
Payer: MEDICARE

## 2023-07-29 VITALS
SYSTOLIC BLOOD PRESSURE: 102 MMHG | HEIGHT: 69 IN | DIASTOLIC BLOOD PRESSURE: 74 MMHG | HEART RATE: 87 BPM | WEIGHT: 165 LBS | TEMPERATURE: 98.1 F | OXYGEN SATURATION: 96 % | BODY MASS INDEX: 24.44 KG/M2 | RESPIRATION RATE: 18 BRPM

## 2023-07-29 DIAGNOSIS — S69.91XD INJURY OF RIGHT THUMB, SUBSEQUENT ENCOUNTER: ICD-10-CM

## 2023-07-29 DIAGNOSIS — L03.011 FELON OF FINGER OF RIGHT HAND: ICD-10-CM

## 2023-07-29 PROCEDURE — 99213 OFFICE O/P EST LOW 20 MIN: CPT | Mod: 25

## 2023-07-29 PROCEDURE — 73140 X-RAY EXAM OF FINGER(S): CPT | Mod: TC,FY,RT

## 2023-07-29 PROCEDURE — 90471 IMMUNIZATION ADMIN: CPT

## 2023-07-29 PROCEDURE — 3074F SYST BP LT 130 MM HG: CPT

## 2023-07-29 PROCEDURE — 3078F DIAST BP <80 MM HG: CPT

## 2023-07-29 PROCEDURE — 90715 TDAP VACCINE 7 YRS/> IM: CPT

## 2023-07-29 RX ORDER — DOXYCYCLINE HYCLATE 100 MG
100 TABLET ORAL 2 TIMES DAILY
Qty: 14 TABLET | Refills: 0 | Status: SHIPPED | OUTPATIENT
Start: 2023-07-29 | End: 2023-08-05

## 2023-07-29 RX ORDER — CEPHALEXIN 500 MG/1
500 CAPSULE ORAL 4 TIMES DAILY
Qty: 20 CAPSULE | Refills: 0 | Status: SHIPPED | OUTPATIENT
Start: 2023-07-29 | End: 2023-08-03

## 2023-07-29 ASSESSMENT — ENCOUNTER SYMPTOMS
MUSCULOSKELETAL NEGATIVE: 1
EYES NEGATIVE: 1
CARDIOVASCULAR NEGATIVE: 1
NEUROLOGICAL NEGATIVE: 1
CONSTITUTIONAL NEGATIVE: 1
RESPIRATORY NEGATIVE: 1
GASTROINTESTINAL NEGATIVE: 1

## 2023-07-29 ASSESSMENT — FIBROSIS 4 INDEX: FIB4 SCORE: 1.01

## 2023-07-29 NOTE — PROGRESS NOTES
Subjective:   Jareth Sunshine is a very pleasant 68 y.o. male who presents for:    Chief Complaint   Patient presents with    Finger Pain     Right thumb pain, was her x3 days ago not feeling better        HPI:    The patient presents for a follow-up appointment for right thumb pain and increased swelling. Approximately eight days ago, he hit his right thumb on a cabinet. His wife attempte a trepanation, which was unsuccessful. He was seen in the  pm 7/26 and was started on a five day course of cephalexin and had a negative XRAY. He feels that the thumb swelling and pain was initially better, but has been unchnged since the initiation of antibiotic therapy. The pain is described as throbbing and is worse at night. Denies numbness and tingling, fevers, chills, body aches. His wife marked the area of erythema and swelling. Swelling and pain are worse.     ROS:    Review of Systems   Constitutional: Negative.    HENT: Negative.     Eyes: Negative.    Respiratory: Negative.     Cardiovascular: Negative.    Gastrointestinal: Negative.    Genitourinary: Negative.    Musculoskeletal: Negative.    Skin: Negative.         Erythema, swelling, tip of the right thumb    Neurological: Negative.    All other systems reviewed and are negative.      Medications:      Current Outpatient Medications   Medication Sig    HYDROcodone-acetaminophen (NORCO) 5-325 MG Tab per tablet  (Patient not taking: Reported on 7/26/2023)    phenazopyridine (PYRIDIUM) 200 MG Tab  (Patient not taking: Reported on 7/26/2023)    cephALEXin (KEFLEX) 500 MG Cap Take 1 Capsule by mouth 2 times a day for 5 days.    atorvastatin (LIPITOR) 10 MG Tab TAKE ONE TABLET BY MOUTH EVERY DAY    clotrimazole-betamethasone (LOTRISONE) 1-0.05 % Cream Apply 1 Application topically 2 times a day. Use as needed for rash. (Patient not taking: Reported on 7/26/2023)       Allergies:     No Known Allergies    Problem List:     Patient Active Problem List   Diagnosis     Dyslipidemia    History of bladder cancer       Surgical History:    Past Surgical History:   Procedure Laterality Date    CYSTOSCOPY N/A 6/6/2019    Procedure: CYSTOSCOPY;  Surgeon: Jim Murphy M.D.;  Location: SURGERY [facility];  Service: Urology    TRANS URETHRAL RESECTION BLADDER N/A 6/6/2019    Procedure: TRANS URETHRAL RESECTION BLADDER-  TUMOR;  Surgeon: Jim Murphy M.D.;  Location: SURGERY [facility];  Service: Urology    OTHER      Bladder laser surgery for cancer    OTHER      Jaw surgery 35 years ago       Past Social Hx:     Social History     Socioeconomic History    Marital status:     Highest education level: Bachelor's degree (e.g., BA, AB, BS)   Tobacco Use    Smoking status: Never    Smokeless tobacco: Never   Vaping Use    Vaping Use: Never used   Substance and Sexual Activity    Alcohol use: Yes     Comment: 0-2 per month    Drug use: No    Sexual activity: Yes     Partners: Female     Comment: Retired, was heating      Social Determinants of Health     Financial Resource Strain: Low Risk  (3/21/2023)    Overall Financial Resource Strain (CARDIA)     Difficulty of Paying Living Expenses: Not hard at all   Food Insecurity: No Food Insecurity (3/21/2023)    Hunger Vital Sign     Worried About Running Out of Food in the Last Year: Never true     Ran Out of Food in the Last Year: Never true   Transportation Needs: No Transportation Needs (3/21/2023)    PRAPARE - Transportation     Lack of Transportation (Medical): No     Lack of Transportation (Non-Medical): No   Physical Activity: Sufficiently Active (3/21/2023)    Exercise Vital Sign     Days of Exercise per Week: 5 days     Minutes of Exercise per Session: 60 min   Stress: No Stress Concern Present (3/21/2023)    [employer] [place] [employer] - Occupational Stress Questionnaire     Feeling of Stress : Not at all   Social Connections: Moderately Isolated (3/21/2023)    Social Connection and  Isolation Panel [NHANES]     Frequency of Communication with Friends and Family: Three times a week     Frequency of Social Gatherings with Friends and Family: Twice a week     Attends Mandaen Services: Never     Active Member of Clubs or Organizations: No     Attends Club or Organization Meetings: Never     Marital Status:    Housing Stability: Low Risk  (3/21/2023)    Housing Stability Vital Sign     Unable to Pay for Housing in the Last Year: No     Number of Places Lived in the Last Year: 1     Unstable Housing in the Last Year: No        Past Family Hx:      Family History   Problem Relation Age of Onset    Arthritis Mother     No Known Problems Father     Diabetes Sister        Problem list, medications, and allergies reviewed by myself today in Epic.     Objective:     Vitals:    07/29/23 0922   BP: 102/74   Pulse: 87   Resp: 18   Temp: 36.7 °C (98.1 °F)   SpO2: 96%       Physical Exam  Vitals reviewed.   Constitutional:       General: He is not in acute distress.     Appearance: Normal appearance. He is not ill-appearing, toxic-appearing or diaphoretic.   HENT:      Head: Normocephalic and atraumatic.      Right Ear: External ear normal.      Left Ear: External ear normal.      Nose: Nose normal.      Mouth/Throat:      Mouth: Mucous membranes are moist.      Pharynx: Oropharynx is clear.   Eyes:      Extraocular Movements: Extraocular movements intact.      Conjunctiva/sclera: Conjunctivae normal.      Pupils: Pupils are equal, round, and reactive to light.   Cardiovascular:      Rate and Rhythm: Normal rate and regular rhythm.      Pulses: Normal pulses.   Pulmonary:      Effort: Pulmonary effort is normal.   Abdominal:      General: Abdomen is flat.      Palpations: Abdomen is soft.   Musculoskeletal:         General: Normal range of motion.      Cervical back: Normal range of motion and neck supple. No rigidity or tenderness.   Lymphadenopathy:      Cervical: No cervical adenopathy.   Skin:      General: Skin is warm and dry.      Findings: Ecchymosis and erythema present.             Comments: TTP over distal aspect of the right thumb with ecchymosis present. Swelling over DIP and finger pad with erythema, warmth, swelling.     Neurological:      General: No focal deficit present.      Mental Status: He is alert and oriented to person, place, and time. Mental status is at baseline.   Psychiatric:         Mood and Affect: Mood normal.         Behavior: Behavior normal.         Thought Content: Thought content normal.         Judgment: Judgment normal.       X-rays were reviewed by BETO, confirmed by radiology:   RADIOLOGY RESULTS   DX-FINGER(S) 2+ RIGHT    Result Date: 7/29/2023 7/29/2023 9:54 AM HISTORY/REASON FOR EXAM:  Pain/Deformity Following Trauma; Thumb pain, swelling. First digit pain, thumb pain and swelling. Bruising.. TECHNIQUE/EXAM DESCRIPTION AND NUMBER OF VIEWS:  3 views of the RIGHT fingers. COMPARISON: 7/26/2023 FINDINGS: No fracture, malalignment, or gross soft tissue abnormality.     No acute process.    DX-FINGER(S) 2+ RIGHT    Result Date: 7/26/2023 7/26/2023 10:42 AM HISTORY/REASON FOR EXAM:  Pain/Deformity Following Trauma; thumb, injury 4 days ago Right first digit pain. TECHNIQUE/EXAM DESCRIPTION AND NUMBER OF VIEWS:  3 views of the RIGHT fingers. COMPARISON: None FINDINGS: No acute fracture or dislocation. 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     No acute osseous abnormality.                 Assessment/Plan:     Diagnosis and associated orders:     1. Injury of right thumb, subsequent encounter  - DX-FINGER(S) 2+ RIGHT; Future  - Tdap =>6yo IM  - Referral to Hand Surgery    2. Felon of finger of right hand  - cephALEXin (KEFLEX) 500 MG Cap; Take 1 Capsule by mouth 4 times a day for 5 days.  Dispense: 20 Capsule; Refill: 0  - doxycycline (VIBRAMYCIN) 100 MG Tab; Take 1 Tablet by mouth 2 times a day for 7 days.  Dispense: 14 Tablet; Refill: 0          Comments/MDM:     Repeat  thumb x-ray does not demonstrate presence of osteoarthritis, necrotizing fasciitis, or any other concerning symptoms that would warrant emergency evaluation  Prescription for cephalexin and doxycycline sent to patient's preferred pharmacy for the treatment of felon/pulpitis  Incision and drainage not indicated at this time  Urgent referral placed to hand surgery for evaluation and treatment  May take over-the-counter Tylenol/ibuprofen as needed for pain  Strict ER precautions in place that swelling, erythema, pain worsen or patient develops systemic signs of infection, including fever, chills, myalgias         All questions answered. Patient verbalized understanding and is in agreement with this plan of care.     If symptoms are worsening or not improving in 3-5 days, follow-up with PCP or return to UC. Differential diagnosis, natural history, and supportive care discussed. AVS handout given and reviewed with patient. Patient educated on red flags and when to seek treatment back in ED or UC.     I personally reviewed prior external notes and test results pertinent to today's visit.  I have independently reviewed and interpreted all diagnostics ordered during this urgent care visit.     This dictation has been created using voice recognition software. The accuracy of the dictation is limited by the abilities of the software. I expect there may be some errors of grammar and possibly content. I made every attempt to manually correct the errors within my dictation. However, errors related to voice recognition software may still exist and should be interpreted within the appropriate context.    This note was electronically signed by NETTE Barney

## 2023-09-25 RX ORDER — ATORVASTATIN CALCIUM 10 MG/1
TABLET, FILM COATED ORAL
Qty: 100 TABLET | Refills: 4 | Status: SHIPPED | OUTPATIENT
Start: 2023-09-25

## 2023-11-08 ENCOUNTER — OFFICE VISIT (OUTPATIENT)
Dept: MEDICAL GROUP | Facility: LAB | Age: 68
End: 2023-11-08
Payer: MEDICARE

## 2023-11-08 VITALS
HEART RATE: 68 BPM | WEIGHT: 166.2 LBS | RESPIRATION RATE: 12 BRPM | OXYGEN SATURATION: 96 % | DIASTOLIC BLOOD PRESSURE: 76 MMHG | TEMPERATURE: 97.9 F | BODY MASS INDEX: 24.62 KG/M2 | SYSTOLIC BLOOD PRESSURE: 110 MMHG | HEIGHT: 69 IN

## 2023-11-08 DIAGNOSIS — E78.5 DYSLIPIDEMIA: ICD-10-CM

## 2023-11-08 DIAGNOSIS — Z12.5 SCREENING FOR PROSTATE CANCER: ICD-10-CM

## 2023-11-08 DIAGNOSIS — Z23 NEED FOR VACCINATION: ICD-10-CM

## 2023-11-08 DIAGNOSIS — M25.551 RIGHT HIP PAIN: ICD-10-CM

## 2023-11-08 PROCEDURE — 99214 OFFICE O/P EST MOD 30 MIN: CPT | Mod: 25 | Performed by: FAMILY MEDICINE

## 2023-11-08 PROCEDURE — 90662 IIV NO PRSV INCREASED AG IM: CPT | Performed by: FAMILY MEDICINE

## 2023-11-08 PROCEDURE — 3074F SYST BP LT 130 MM HG: CPT | Performed by: FAMILY MEDICINE

## 2023-11-08 PROCEDURE — 3078F DIAST BP <80 MM HG: CPT | Performed by: FAMILY MEDICINE

## 2023-11-08 PROCEDURE — G0008 ADMIN INFLUENZA VIRUS VAC: HCPCS | Performed by: FAMILY MEDICINE

## 2023-11-08 ASSESSMENT — FIBROSIS 4 INDEX: FIB4 SCORE: 1.01

## 2023-11-08 NOTE — PROGRESS NOTES
"Subjective:     CC: Hip pain    HPI:   Jareth presents today with concern for right hip pain.  This is a chronic issue with seems to be slowly increasing.  Pain is mainly lateral and can be provoked with extended walks or hiking.  He also feels that time that there is something off of the joint almost like it will dislocate.  No discrete injury.  He reports a family history of total hip replacements and most of his siblings and he is wondering if this is may be something he should be looking into for the future.  At this point he is able to stay relatively active and had been doing 9 mile hikes this year    Medications, past medical history, allergies, and social history have been reviewed and updated.      Objective:       Exam:  /76 (BP Location: Left arm, Patient Position: Sitting, BP Cuff Size: Adult)   Pulse 68   Temp 36.6 °C (97.9 °F)   Resp 12   Ht 1.753 m (5' 9\")   Wt 75.4 kg (166 lb 3.2 oz)   SpO2 96%   BMI 24.54 kg/m²  Body mass index is 24.54 kg/m².    Constitutional: Alert. Well appearing. No distress.  Skin: Warm, dry, good turgor, no visible rashes.  Respiratory: Normal effort. Lungs are clear to auscultation bilaterally.  Cardiovascular: Regular rate and rhythm. Normal S1/S2. No murmurs, rubs or gallops.   MSK: No discrete tenderness over the right lateral hip.  Passive and active range of motion are intact, no pain provoked with FADIR or YUE.  Neuro: Moves all four extremities. No facial droop.  Psych: Answers questions appropriately. Normal affect and mood.    Assessment & Plan:     68 y.o. male with the following -     1. Right hip pain  Chronic, lateral right hip pain that can be provoked by extended activities.  Exam unremarkable.  Osteoarthritis versus possible labral tear.  X-ray ordered.  At this point is not severe or persistently activity limiting so recommended NSAIDs as needed and can consider further work-up or referral if persistent or worsening.  - DX-HIP-BILATERAL-WITH " PELVIS-3/4 VIEWS; Future    2. Dyslipidemia  Continue Lipitor, recheck lipids.  - CBC WITH DIFFERENTIAL; Future  - Comp Metabolic Panel; Future  - Lipid Profile; Future    3. Need for vaccination  - INFLUENZA VACCINE, HIGH DOSE (65+ ONLY)    4. Screening for prostate cancer  - PROSTATE SPECIFIC AG SCREENING; Future      Please note that this note was created using voice recognition software.

## 2023-11-09 ENCOUNTER — HOSPITAL ENCOUNTER (OUTPATIENT)
Dept: RADIOLOGY | Facility: MEDICAL CENTER | Age: 68
End: 2023-11-09
Attending: FAMILY MEDICINE
Payer: MEDICARE

## 2023-11-09 DIAGNOSIS — M25.551 RIGHT HIP PAIN: ICD-10-CM

## 2023-11-09 PROCEDURE — 73522 X-RAY EXAM HIPS BI 3-4 VIEWS: CPT

## 2023-11-17 ENCOUNTER — PATIENT MESSAGE (OUTPATIENT)
Dept: MEDICAL GROUP | Facility: LAB | Age: 68
End: 2023-11-17
Payer: MEDICARE

## 2023-11-17 DIAGNOSIS — M25.551 RIGHT HIP PAIN: ICD-10-CM

## 2024-01-18 ENCOUNTER — PHYSICAL THERAPY (OUTPATIENT)
Dept: PHYSICAL THERAPY | Facility: MEDICAL CENTER | Age: 69
End: 2024-01-18
Attending: FAMILY MEDICINE
Payer: MEDICARE

## 2024-01-18 DIAGNOSIS — M25.551 RIGHT HIP PAIN: ICD-10-CM

## 2024-01-18 PROCEDURE — 97110 THERAPEUTIC EXERCISES: CPT

## 2024-01-18 PROCEDURE — 97162 PT EVAL MOD COMPLEX 30 MIN: CPT

## 2024-01-18 ASSESSMENT — ENCOUNTER SYMPTOMS
PAIN SCALE AT LOWEST: 0
PAIN SCALE AT HIGHEST: 1
PAIN LOCATION: R LATERAL HIP
PAIN SCALE: 0

## 2024-01-18 ASSESSMENT — ACTIVITIES OF DAILY LIVING (ADL): POOR_BALANCE: 1

## 2024-01-18 NOTE — OP THERAPY EVALUATION
"  Outpatient Physical Therapy  INITIAL EVALUATION    [facility] Outpatient Physical Therapy  [address]  [place] [address]  Phone:  [phone]  Fax:  [phone]    Date of Evaluation: 01/18/2024    Patient: Jareth Sunshine  YOB: 1955  MRN: [id number]     Referring Provider: Chandana Hummel M.D.  [address] [facility]  [address]  [place] [address]   Referring Diagnosis Right hip pain [M25.551]     Time Calculation  Start time: 0949  Stop time: 1030 Time Calculation (min): 41 minutes         Chief Complaint: Hip Problem    Visit Diagnoses     ICD-10-CM   1. Right hip pain  M25.551       Date of onset of impairment: No data found    Subjective:   History of Present Illness:     Mechanism of injury:  Patient is a 68 year old male with a PMH including: Dyslipidemia, hx of bladder cancer, cystoscopy, laser surgery for cancer, jaw surgery 35 years ago, kidney stones. No longer getting check ins with oncologist since last year. Hx of L ankle sprains. Active in basketball, track in high school.     Pt presents to therapy with complaints of R lateral hip pain and occasionally more recently on L, that started 9 months ago with unclear ROBERT. Pt stating R hip felt that it was \"going to come out of the joint.\" Denies numbness/tingling in extremities. Pt describes s/s in lateral hip. S/s are tight and there can be occasional popping during walking with leg posterior, he believes. Denies falls. No recent back pain complaints.     Per Dr. Hummel's note on 11/8/23, \"Jareth presents today with concern for right hip pain.  This is a chronic issue with seems to be slowly increasing.  Pain is mainly lateral and can be provoked with extended walks or hiking.  He also feels that time that there is something off of the joint almost like it will dislocate.  No discrete injury.  He reports a family history of total hip replacements and most of his siblings and he is " "wondering if this is may be something he should be looking into for the future.  At this point he is able to stay relatively active and had been doing 9 mile hikes this year.\"    Currently denies changes in bowel and bladder, saddle anesthesia, significant weight changes, chills/night sweats, nausea and vomiting, and unexplained fatigue. Denies history of cancer. Pt consents to evaluation and treatment today.         Pain:     Current pain ratin    At best pain ratin    At worst pain ratin    Location:  R lateral hip    Progression:  Stable    Pain Comments::  Aggravating: Walking on hard surfaces such as concrete and pavement>2-3 miles, very occasional discomfort with stairs, ladders    No s/s with prolonged standing or sitting    Relieving: resting in sitting/standing  Social Support:     Lives in:  Multiple-level home    Lives with:  Spouse  Diagnostic Tests:     Diagnostic Tests Comments:  Hip x ray 23:  FINDINGS:  No acute fracture or dislocation.     Mild to moderate osteoarthritis of the bilateral hip joints.     Evaluation of the sacrum and bilateral iliac crests is limited due to overlying bowel content.     IMPRESSION:        1. No acute osseous abnormality.    Treatments:     Treatment Comments:  None   Activities of Daily Living:     Patient reported ADL status: Patient's current daily routine includes:  Work: Retired  Hobbies: skiing, e-bike, fishing, hiking  Exercise: Gym 2x/wk -push ups/squats, leg lifts with weights, upper body weights, TM 30 min per day (incline: 12)       Patient Goals:     Patient goals for therapy:  Decreased pain    Other patient goals:  Improve walking      Past Medical History:   Diagnosis Date    Cancer (HCC)     Bladder, s/p treatment    Hyperlipidemia     Renal disorder     kidney stone     Past Surgical History:   Procedure Laterality Date    CYSTOSCOPY N/A 2019    Procedure: CYSTOSCOPY;  Surgeon: Jim Murphy M.D.;  Location: SURGERY [facility]" ORS;  Service: Urology    TRANS URETHRAL RESECTION BLADDER N/A 6/6/2019    Procedure: TRANS URETHRAL RESECTION BLADDER-  TUMOR;  Surgeon: Jim Murphy M.D.;  Location: SURGERY [facility];  Service: Urology    OTHER      Bladder laser surgery for cancer    OTHER      Jaw surgery 35 years ago     Social History     Tobacco Use    Smoking status: Never    Smokeless tobacco: Never   Substance Use Topics    Alcohol use: Yes     Comment: 0-2 per month     Family and Occupational History     Socioeconomic History    Marital status:      Spouse name: Not on file    Number of children: Not on file    Years of education: Not on file    Highest education level: Bachelor's degree (e.g., BA, AB, BS)   Occupational History    Not on file       Objective     Observations     Additional Observation Details  Squat to table: WFL  SLS: Impaired <10 sec; worse on L     Lumbar Screen  Lumbar range of motion within normal limits.    Palpation     Additional Palpation Details  Slight TTP inferior to R greater trochanter    Active Range of Motion     Additional Active Range of Motion Details  Knee extension and ankle ROM WFL seated   Hip flexion on R reproductive of s/s     Hip motion:  Slightly limited HS length on R; WFL L  Hip IR and ER WFL -tested 90/90    Modified porfirio test:  Tightness in hip flexors and rectus femoris L>R    Strength:      Additional Strength Details  Hip abd: 3+ to 4-/5 B in SL; tendency for compensation using hip flexors  Supine LE extension test: 3+/5 B     Tests     Left Hip   Negative FADIR and scour.   SLR: Negative.     Right Hip   Negative YUE, FADIR and scour.   SLR: Negative.         Therapeutic Exercises (CPT 80769):     1. pt education, re: PT exam findings and upcoming PT POC    2. new      Time-based treatments/modalities:    Physical Therapy Timed Treatment Charges  Therapeutic exercise minutes (CPT 67178): 11 minutes      Assessment, Response and Plan:   Impairments: abnormal or  restricted ROM, impaired balance, impaired physical strength and lacks appropriate home exercise program    Assessment details:  Patient is a pleasant and cooperative 68 year old male. He presents to therapy with 9 month hx of chronic lateral R hip pain, initiated with unclear ROBERT. Noticing some discomfort on very rare occasions on L hip as well in more recent months.  Denies red flags. Hip x ray remarkable for mild to mod OA B with no acute findings. Exam findings suggestive of slight tightness at HS R, quads and hip flexors B. More notable findings of weakness at prox pelvic girdle B and core, inability for SLS without pelvic drop >10 sec B. Neg lumbar screen, scour, and YUE. Pt likely would benefit from stabilization, strengthening, and endurance program based on recreational hobbies. Pt may benefit from skilled physical therapy in order to address above impairments in order to improve QOL and return to reported ADL's.     Prognosis: good    Goals:   Short Term Goals:   1. Pt will be independent with written HEP.      Short term goal time span:  2-4 weeks      Long Term Goals:    1. Pt will be independent with written HEP.  2. Pt will have a sig improvement in WOMAC score (eval: 8.33)  3. Pt will be able to amb on level and uneven terrain for 3 miles consistently without incr in baseline s/s.   4. Pt will be able to navigate one flight of stairs with 1HR without incr in s/s.     Long term goal time span:  6-8 weeks    Plan:   Therapy options:  Physical therapy treatment to continue  Planned therapy interventions:  Neuromuscular Re-education (CPT 20164), E Stim Unattended (CPT 62523), Manual Therapy (CPT 86253) and Therapeutic Exercise (CPT 20599)  Frequency: 1-2x/wk.  Duration in weeks:  8  Discussed with:  Patient  Plan details:  UPOC: 3/15/24            Functional Assessment Used  WOMAC Grand Total: 8.33     Referring provider co-signature:  I have reviewed this plan of care and my co-signature certifies the  need for services.    Certification Period: 01/18/2024 to  3/15/24    Physician Signature: ________________________________ Date: ______________

## 2024-01-23 ENCOUNTER — PHYSICAL THERAPY (OUTPATIENT)
Dept: PHYSICAL THERAPY | Facility: MEDICAL CENTER | Age: 69
End: 2024-01-23
Attending: FAMILY MEDICINE
Payer: MEDICARE

## 2024-01-23 DIAGNOSIS — M25.551 RIGHT HIP PAIN: ICD-10-CM

## 2024-01-23 PROCEDURE — 97110 THERAPEUTIC EXERCISES: CPT

## 2024-01-23 NOTE — OP THERAPY DAILY TREATMENT
Outpatient Physical Therapy  DAILY TREATMENT     [facility] Outpatient Physical Therapy  [address]  [place] [address]  Phone:  [phone]  Fax:  [phone]    Date: 01/23/2024    Patient: Jareth Sunshine  YOB: 1955  MRN: [id number]     Time Calculation    Start time: 1501  Stop time: 1544 Time Calculation (min): 43 minutes         Chief Complaint: Hip Problem    Visit #: 2    SUBJECTIVE:  Noticing more clicking in general following the exercises during walking. Has not gone on any extensive walks. Went skiing twice since last eval without complaints.     OBJECTIVE:  Current objective measures:     From eval:  Squat to table: WFL  SLS: Impaired <10 sec; worse on L     Additional Palpation Details  Slight TTP inferior to R greater trochanter    Modified porfirio test:  Tightness in hip flexors and rectus femoris L>R     Strength:       Additional Strength Details  Hip abd: 3+ to 4-/5 B in SL; tendency for compensation using hip flexors  Supine LE extension test: 3+/5 B     Therapeutic Exercises (CPT 73466):     1. recumbent bike, x5 min L1, cardiovascular warm up    2. clamshells, x10 with pink TB 2 sec hold ea, reviewed    3. ball bridge, x2 min (calves supported), reviewed    4. toy soldier, pink TB, at bar, 1x endurance, fatigue; VC's for incr weight shift; hep    5. marching bridge, x10 ea, hep    7. hip flexor stretch in Truestretch, 30 sec ea, hep on stairs at home    8. stool lunges, x10 ea    11. *bandaid applied to open and bleeding cut on finger      Therapeutic Exercise Summary: Access Code: [email or web address]  URL: [email or web address]  Date: 01/23/2024  Prepared by: Simba Andino    Exercises  - Hip Flexor Stretch with Chair  - 7 x weekly - 2 sets - 2 reps - 30 sec hold  - Marching Bridge  - 3 x weekly - 1-2 sets - 10 reps    Pt performed these exercises with instruction and SPV.  Provided handout with these exercises for daily  HEP.          Time-based treatments/modalities:    Physical Therapy Timed Treatment Charges  Therapeutic exercise minutes (CPT 72185): 43 minutes      Pain Comments::  Aggravating: Walking on hard surfaces such as concrete and pavement>2-3 miles, very occasional discomfort with stairs, ladders     ASSESSMENT:   Response to treatment:   Good carryover to initial hep. Pt fatiguing in frontal plane with pink TB. Pelvic drop noted on with single limb challenges bilaterally. Will continue to challenge these areas in upcoming sessions with emphasis on endurance training.    PLAN/RECOMMENDATIONS:   Plan for treatment: therapy treatment to continue next visit.  Planned interventions for next visit: continue with current treatment.

## 2024-01-25 ENCOUNTER — PHYSICAL THERAPY (OUTPATIENT)
Dept: PHYSICAL THERAPY | Facility: MEDICAL CENTER | Age: 69
End: 2024-01-25
Attending: FAMILY MEDICINE
Payer: MEDICARE

## 2024-01-25 DIAGNOSIS — M25.551 RIGHT HIP PAIN: ICD-10-CM

## 2024-01-25 PROCEDURE — 97110 THERAPEUTIC EXERCISES: CPT

## 2024-01-25 NOTE — OP THERAPY DAILY TREATMENT
Outpatient Physical Therapy  DAILY TREATMENT     [facility] Outpatient Physical Therapy  [address]  [place] [address]  Phone:  [phone]  Fax:  [phone]    Date: 01/25/2024    Patient: Jareth Sunshine  YOB: 1955  MRN: [id number]     Time Calculation    Start time: 1416  Stop time: 1457 Time Calculation (min): 41 minutes         Chief Complaint: Hip Problem    Visit #: 3    SUBJECTIVE:  Pt went on a 2 mile walk and stating he felt his hips but there wasn't any pain. Believes he is getting better.       OBJECTIVE:  Current objective measures:     From eval:  Squat to table: WFL  SLS: Impaired <10 sec; worse on L     Additional Palpation Details  Slight TTP inferior to R greater trochanter    Modified porfirio test:  Tightness in hip flexors and rectus femoris L>R     Strength:       Additional Strength Details  Hip abd: 3+ to 4-/5 B in SL; tendency for compensation using hip flexors  Supine LE extension test: 3+/5 B     Therapeutic Exercises (CPT 93164):     1. recumbent bike, x5 min L1, cardiovascular warm up    2. clamshells, x10 with pink TB 2 sec hold ea, reviewed    3. ball bridge, x2 min (calves supported), reviewed    4. toy soldier, pink TB, at bar, 1x endurance, fatigue; VC's for incr weight shift; hep    5. marching bridge, x10 ea, hep    7. hip flexor stretch in Truestretch, 30 sec ea, hep on stairs at home    8. stool lunges, x15 ea, fatigue    9. step downs, 4 in; x10 ea, cuing for hinge at waist and set up; incr difficulty on R    10. step up to 90/90, 6 in; x10 with hold 1-2 sec ea, fingertips on bar for balance; incr difficulty on R    17. monster walking, x5 min, pink TB, fatigue; SPV for safety      Therapeutic Exercise Summary: Access Code: [email or web address]  URL: [email or web address]  Date: 01/23/2024  Prepared by: Simba Andino    Exercises  - Hip Flexor Stretch with Chair  - 7 x weekly - 2 sets - 2 reps - 30 sec hold  -  Marching Bridge  - 3 x weekly - 1-2 sets - 10 reps    Pt performed these exercises with instruction and SPV.  Provided handout with these exercises for daily HEP.          Time-based treatments/modalities:    Physical Therapy Timed Treatment Charges  Therapeutic exercise minutes (CPT 37566): 41 minutes    Pain Comments::  Aggravating: Walking on hard surfaces such as concrete and pavement>2-3 miles, very occasional discomfort with stairs, ladders       ASSESSMENT:   Response to treatment:   Pt reporting pos impact of hep and sessions on his recreational activities; stating s/s with walking have improved. Pt does have increased weakness R single limb>L as noted in sessions. Pt may continue to benefit from pelvic girdle stabilization.    PLAN/RECOMMENDATIONS:   Plan for treatment: therapy treatment to continue next visit.  Planned interventions for next visit: continue with current treatment.  Hip adduction stretch, repeat step up and step downs; add lateral step ups and forward step downs

## 2024-01-30 ENCOUNTER — PHYSICAL THERAPY (OUTPATIENT)
Dept: PHYSICAL THERAPY | Facility: MEDICAL CENTER | Age: 69
End: 2024-01-30
Attending: FAMILY MEDICINE
Payer: MEDICARE

## 2024-01-30 DIAGNOSIS — M25.551 RIGHT HIP PAIN: ICD-10-CM

## 2024-01-30 PROCEDURE — 97110 THERAPEUTIC EXERCISES: CPT

## 2024-01-30 NOTE — OP THERAPY DAILY TREATMENT
Outpatient Physical Therapy  DAILY TREATMENT     [facility] Outpatient Physical Therapy  [address]  [place] [address]  Phone:  [phone]  Fax:  [phone]    Date: 01/30/2024    Patient: Jareth Sunshine  YOB: 1955  MRN: [id number]     Time Calculation    Start time: 1416              Chief Complaint: Hip Problem    Visit #: 4    SUBJECTIVE:  Pt went skiing and had no problems with his hep. Believes everything is working.       OBJECTIVE:  Current objective measures:     From eval:  Squat to table: WFL  SLS: Impaired <10 sec; worse on L     Additional Palpation Details  Slight TTP inferior to R greater trochanter    Modified porfirio test:  Tightness in hip flexors and rectus femoris L>R     Strength:       Additional Strength Details  Hip abd: 3+ to 4-/5 B in SL; tendency for compensation using hip flexors  Supine LE extension test: 3+/5 B     Therapeutic Exercises (CPT 83910):     1. recumbent bike, x5 min L1, cardiovascular warm up    2. clamshells, x10 with pink TB 2 sec hold ea, reviewed    3. ball bridge, x2 min (calves supported), reviewed    4. toy soldier, pink TB, at bar, 1x endurance, fatigue; VC's for incr weight shift; hep    5. marching bridge, x10 ea, hep    7. hip flexor stretch in Truestretch, 30 sec ea, hep on stairs at home    8. stool lunges, x15 ea, fatigue    9. step downs, 4->6 in; x10 ea, cuing for hinge at waist and set up; incr difficulty on R    10. step up to 90/90, 6 in; x10 with hold 1-2 sec ea, fingertips on bar for balance; incr difficulty on R    12. hip adduction stretch at counter    17. monster walking, x5 min, pink TB, verbal review    18. sit to stands with natasha disc under one foot., x15    19. staggered stance sit to stands, x10 ea, incr muscle juddering on R; hep    20. SL sit to stands, x5 with dowel for balance assist      Therapeutic Exercise Summary: Access Code: [email or web address]  URL:  [email or web address]  Date: 01/23/2024  Prepared by: Simba Andino    Exercises  - Hip Flexor Stretch with Chair  - 7 x weekly - 2 sets - 2 reps - 30 sec hold  - Marching Bridge  - 3 x weekly - 1-2 sets - 10 reps    Pt performed these exercises with instruction and SPV.  Provided handout with these exercises for daily HEP.        Time-based treatments/modalities:       Pain Comments::  Aggravating: Walking on hard surfaces such as concrete and pavement>2-3 miles, very occasional discomfort with stairs, ladders       ASSESSMENT:   Response to treatment:   Inability to complete >1-2 SL squats from standard height plinth but able to manage using UE support with dowel or staggered positioning with BLE's on firm. Pt is demonstrating improving strength at pelvic girdle with pos impact on his ADL's. Due to challenges noted with SL R>L; will continue to challenge with SL specific activities.     PLAN/RECOMMENDATIONS:   Plan for treatment: therapy treatment to continue next visit.  Planned interventions for next visit: continue with current treatment.

## 2024-02-01 ENCOUNTER — PHYSICAL THERAPY (OUTPATIENT)
Dept: PHYSICAL THERAPY | Facility: MEDICAL CENTER | Age: 69
End: 2024-02-01
Attending: FAMILY MEDICINE
Payer: MEDICARE

## 2024-02-01 DIAGNOSIS — M25.551 RIGHT HIP PAIN: ICD-10-CM

## 2024-02-01 PROCEDURE — 97110 THERAPEUTIC EXERCISES: CPT

## 2024-02-01 NOTE — OP THERAPY DAILY TREATMENT
Outpatient Physical Therapy  DAILY TREATMENT     [facility] Outpatient Physical Therapy  [address]  [place] [address]  Phone:  [phone]  Fax:  [phone]    Date: 02/01/2024    Patient: Jareth Sunshine  YOB: 1955  MRN: [id number]     Time Calculation    Start time: 1030              Chief Complaint: Hip Problem    Visit #: 5    SUBJECTIVE:  Pt reporting his quads are sore from the last session. He noticed some pain-free clicking in the right hip when he left. Not impeding activity or causing pain but of interest.      OBJECTIVE:  Current objective measures:     From eval:  Squat to table: WFL  SLS: Impaired <10 sec; worse on L     Additional Palpation Details  Slight TTP inferior to R greater trochanter    Modified porfirio test:  Tightness in hip flexors and rectus femoris L>R     Strength:       Additional Strength Details  Hip abd: 3+ to 4-/5 B in SL; tendency for compensation using hip flexors  Supine LE extension test: 3+/5 B     Therapeutic Exercises (CPT 29257):     1. recumbent bike, x5 min L1, cardiovascular warm up    2. clamshells, x10 with pink TB 2 sec hold ea, reviewed    3. ball bridge, x2 min (calves supported), reviewed    4. toy soldier, pink TB, at bar, 1x endurance, fatigue; VC's for incr weight shift; hep    5. marching bridge in hooklying and then standing, x15 ea; orange TB, progressed resistance    6. hip circles, x30 sec CW and CCW; orange TB, hep    13. quad stretch standing with plinth and 2 poles for balance, 2x30 sec ea    14. hip flexion stretch in truestretch, x30 sec ea    15. hip adduction stretch, x15 ea, hep    16. ball bridge+HS rolls, 4x3    17. ball bridge+SL lifts, x10 ea      Therapeutic Exercise Summary: Access Code: [email or web address]  URL: [email or web address]  Date: 01/23/2024  Prepared by: Simba Andino    Exercises  - Hip Flexor Stretch with Chair  - 7 x weekly - 2 sets - 2 reps - 30 sec hold  - Marching  Bridge  - 3 x weekly - 1-2 sets - 10 reps    Pt performed these exercises with instruction and SPV.  Provided handout with these exercises for daily HEP.          Time-based treatments/modalities:       Pain Comments::  Aggravating: Walking on hard surfaces such as concrete and pavement>2-3 miles, very occasional discomfort with stairs, ladders       ASSESSMENT:   Response to treatment:   Progression to level II core stab exercises with pt fatiguing following hip circles. Is demonstrating improving stability on SL with fatigue only, no incr in s/s. Pt aware of exercise-fatigue at end of session. Will continue to promote SL specific activities.     PLAN/RECOMMENDATIONS:   Plan for treatment: therapy treatment to continue next visit.  Planned interventions for next visit: continue with current treatment.

## 2024-02-06 ENCOUNTER — APPOINTMENT (OUTPATIENT)
Dept: PHYSICAL THERAPY | Facility: MEDICAL CENTER | Age: 69
End: 2024-02-06
Attending: FAMILY MEDICINE
Payer: MEDICARE

## 2024-02-08 ENCOUNTER — PHYSICAL THERAPY (OUTPATIENT)
Dept: PHYSICAL THERAPY | Facility: MEDICAL CENTER | Age: 69
End: 2024-02-08
Attending: FAMILY MEDICINE
Payer: MEDICARE

## 2024-02-08 DIAGNOSIS — M25.551 RIGHT HIP PAIN: ICD-10-CM

## 2024-02-08 PROCEDURE — 97110 THERAPEUTIC EXERCISES: CPT

## 2024-02-08 PROCEDURE — 97140 MANUAL THERAPY 1/> REGIONS: CPT

## 2024-02-08 NOTE — OP THERAPY DAILY TREATMENT
Outpatient Physical Therapy  DAILY TREATMENT     [facility] Outpatient Physical Therapy  [address]  [place] [address]  Phone:  [phone]  Fax:  [phone]    Date: 02/08/2024    Patient: Jareth Sunshine  YOB: 1955  MRN: [id number]     Time Calculation    Start time: 1030  Stop time: 1111 Time Calculation (min): 41 minutes         Chief Complaint: Hip Problem    Visit #: 6    SUBJECTIVE:  Pt reporting he is still experiencing some pain-free clicking in the hip with walking from transition of midstance to terminal stance.       OBJECTIVE:  Current objective measures:     From eval:  Squat to table: WFL  SLS: Impaired <10 sec; worse on L     Additional Palpation Details  Slight TTP inferior to R greater trochanter    Modified porfirio test:  Tightness in hip flexors and rectus femoris L>R     Strength:       Additional Strength Details  Hip abd: 3+ to 4-/5 B in SL; tendency for compensation using hip flexors  Supine LE extension test: 3+/5 B     Therapeutic Exercises (CPT 41271):     1. recumbent bike, x5 min L1, cardiovascular warm up    2. clamshells, x10 with pink TB 2 sec hold ea, NT    4. toy soldier, pink TB, at bar, 1x endurance, fatigue; VC's for incr weight shift; hep    5. marching bridge in hooklying and then standing, x15 ea; orange TB, NT    6. hip circles, x30 sec CW and CCW; orange TB, NT    13. quad stretch standing with plinth and 2 poles for balance, 5x5  sec ea, NT    14. hip flexion stretch in truestretch, x30 sec ea, NT    15. hip adduction stretch, x15 ea, reviewed    16. ball bridge+HS rolls, 4x4, reviewed    17. ball bridge+SL lifts, 4x4, reviewed      Therapeutic Exercise Summary: Access Code: [email or web address]  URL: [email or web address]  Date: 01/23/2024  Prepared by: Simba Andino    Exercises  - Hip Flexor Stretch with Chair  - 7 x weekly - 2 sets - 2 reps - 30 sec hold  - Marching Bridge  - 3 x weekly - 1-2 sets - 10  "reps    Pt performed these exercises with instruction and SPV.  Provided handout with these exercises for daily HEP.        Therapeutic Treatments and Modalities:     1. Manual Therapy (CPT 85949), see below, x17 min    Therapeutic Treatment and Modalities Summary: LAD, inf and lateral hip mobs; STM to R psoas    Time-based treatments/modalities:    Physical Therapy Timed Treatment Charges  Manual therapy minutes (CPT 30465): 17 minutes  Therapeutic exercise minutes (CPT 80205): 24 minutes    Pain Comments::  Aggravating: Walking on hard surfaces such as concrete and pavement>2-3 miles, very occasional discomfort with stairs, ladders       ASSESSMENT:   Response to treatment:   Good carryover of level II core stab exercises with fatigue following. Discussion with pt to complete partial hep and avoid  over-work and over-fatigue. Pt noting a decrease in \"clicking\" in hip following manual in session today; will assess carryover and may consider repeat manual if this was helpful. Pt otherwise on good trajectory and is not noticing any recent hip discomfort.     PLAN/RECOMMENDATIONS:   Plan for treatment: therapy treatment to continue next visit.  Planned interventions for next visit: continue with current treatment.         "

## 2024-02-13 ENCOUNTER — PHYSICAL THERAPY (OUTPATIENT)
Dept: PHYSICAL THERAPY | Facility: MEDICAL CENTER | Age: 69
End: 2024-02-13
Attending: FAMILY MEDICINE
Payer: MEDICARE

## 2024-02-13 DIAGNOSIS — M25.551 RIGHT HIP PAIN: ICD-10-CM

## 2024-02-13 PROCEDURE — 97140 MANUAL THERAPY 1/> REGIONS: CPT

## 2024-02-13 PROCEDURE — 97110 THERAPEUTIC EXERCISES: CPT

## 2024-02-13 NOTE — OP THERAPY DAILY TREATMENT
Outpatient Physical Therapy  DAILY TREATMENT     [facility] Outpatient Physical Therapy  [address]  [place] [address]  Phone:  [phone]  Fax:  [phone]    Date: 02/13/2024    Patient: Jareth Sunshine  YOB: 1955  MRN: [id number]     Time Calculation    Start time: 1420  Stop time: 1459 Time Calculation (min): 39 minutes         Chief Complaint: Hip Problem    Visit #: 7    SUBJECTIVE:  Noticed some improvements with clicking from manual from last session. Stating he has been skiing without issues. No recent pain.      OBJECTIVE:  Current objective measures:     From eval:  Squat to table: WFL  SLS: Impaired <10 sec; worse on L     Additional Palpation Details  Slight TTP inferior to R greater trochanter    Modified porfirio test:  Tightness in hip flexors and rectus femoris L>R     Strength:       Additional Strength Details  Hip abd: 3+ to 4-/5 B in SL; tendency for compensation using hip flexors  Supine LE extension test: 3+/5 B     Therapeutic Exercises (CPT 89057):     1. recumbent bike, x5 min L1, cardiovascular warm up    2. clamshells, x10 with pink TB 2 sec hold ea, NT    4. toy soldier, pink TB, at bar, 1x endurance, NT    5. marching bridge in hooklying and then standing, x15 ea; orange TB, NT    6. hip circles, x30 sec CW and CCW; orange TB, NT    12. staggered sit to stands, x10 ea    13. quad stretch standing with plinth and 2 poles for balance, 5x5  sec ea, NT    14. hip flexion stretch in truestretch, x30 sec ea, NT    15. hip adduction stretch, x15 ea, NT    16. ball bridge+HS rolls, x10, reviewed    17. ball bridge+SL lifts, x10, reviewed    18. SL ball bridges, x10 ea    19. wall sit, x60 sec    20. hip adduction in Sl, x10 with 2 sec hold, new hep      Therapeutic Exercise Summary: Access Code: [email or web address]  URL: [email or web address]  Date: 01/23/2024  Prepared by: Simba Andino    Exercises  - Hip Flexor Stretch with Chair   - 7 x weekly - 2 sets - 2 reps - 30 sec hold  - Marching Bridge  - 3 x weekly - 1-2 sets - 10 reps    Pt performed these exercises with instruction and SPV.  Provided handout with these exercises for daily HEP.        Therapeutic Treatments and Modalities:     1. Manual Therapy (CPT 78811), see below, x15 min    Therapeutic Treatment and Modalities Summary: LAD, inf, and lateral hip mobs grade III with belt for OP    Time-based treatments/modalities:    Physical Therapy Timed Treatment Charges  Manual therapy minutes (CPT 49419): 15 minutes  Therapeutic exercise minutes (CPT 90608): 24 minutes    Pain Comments::  Aggravating: Walking on hard surfaces such as concrete and pavement>2-3 miles, very occasional discomfort with stairs, ladders       ASSESSMENT:   Response to treatment:   Pt reporting no current s/s or ADL limitations at this time. Overall, positive impact of hep on prev limitations. Due to good trajectory, will review hep next follow up and then keep referral open x 30 days; pt in agreement to this plan.    PLAN/RECOMMENDATIONS:   Plan for treatment: therapy treatment to continue next visit.  Planned interventions for next visit: continue with current treatment.  Assess response to hip adduction

## 2024-02-20 ENCOUNTER — PHYSICAL THERAPY (OUTPATIENT)
Dept: PHYSICAL THERAPY | Facility: MEDICAL CENTER | Age: 69
End: 2024-02-20
Attending: FAMILY MEDICINE
Payer: MEDICARE

## 2024-02-20 DIAGNOSIS — M25.551 RIGHT HIP PAIN: ICD-10-CM

## 2024-02-20 PROCEDURE — 97110 THERAPEUTIC EXERCISES: CPT

## 2024-02-20 NOTE — OP THERAPY PROGRESS SUMMARY
Outpatient Physical Therapy  PROGRESS SUMMARY NOTE      [facility] Outpatient Physical Therapy  [address]  [place] [address]  Phone:  [phone]  Fax:  [phone]    Date of Visit: 02/20/2024    Patient: Jareth Sunshine  YOB: 1955  MRN: [id number]     Referring Provider: Chandana Hummel M.D.  [address]  [address]  [place] [address]   Referring Diagnosis Pain in right hip [M25.551]     Visit Diagnoses     ICD-10-CM   1. Right hip pain  M25.551       Rehab Potential: good    Progress Report Period: 1/18/23-2/20/24    Functional Assessment Used  WOMAC Grand Total: 2.08       Objective Findings and Assessment:   Patient progression towards goals: Pt has been seen for 8 visits of skilled physical therapy and has noted improvements including ability to complete prolonged walking and stairs. Will notice occasional clicking in the hip with ambulation. Occasional discomfort with standing on ladders but notices this is generalized leg soreness vs. Specific targeted hip pain. All goals have been met. Discussion with pt re: keeping referral open x 30 days if needed. If no contact in 30 days, will close referral; pt in agreement with plan.      Objective findings and assessment details: Balance:  SLS: worse on R; >10 B    Modified porfirio test:  Tightness in hip flexors and rectus femoris L>R     Strength:       Additional Strength Details  Supine LE extension: R: 4+/5, 4-/5   Glute med in SL: 4/5 B       Goals:   Short Term Goals:   1. Pt will be independent with written HEP.  (Met)  Short term goal time span:  2-4 weeks      Long Term Goals:    1. Pt will be independent with written HEP. (Met)  2. Pt will have a sig improvement in WOMAC score (eval: 8.33) (Met)  3. Pt will be able to amb on level and uneven terrain for 3 miles consistently without incr in baseline s/s. (Met)  4. Pt will be able to navigate one flight of stairs with 1HR without  incr in s/s. (Met)    Long term goal time span:  6-8 weeks    Plan:   Planned therapy interventions:  Neuromuscular Re-education (CPT 44827), Gait Training (CPT 10048), Therapeutic Exercise (CPT 55354) and Manual Therapy (CPT 29830)  Frequency:  1x month  Plan details:  UPOC: 3/30/24      Referring provider co-signature:  I have reviewed this plan of care and my co-signature certifies the need for services.     Certification Period: 02/20/2024 to 3/30/24    Physician Signature: ________________________________ Date: ______________

## 2024-02-20 NOTE — OP THERAPY DAILY TREATMENT
Outpatient Physical Therapy  DAILY TREATMENT     [facility] Outpatient Physical Therapy  [address]  [place] [address]  Phone:  [phone]  Fax:  [phone]    Date: 02/20/2024    Patient: Jareth Sunshine  YOB: 1955  MRN: [id number]     Time Calculation    Start time: 1032  Stop time: 1105 Time Calculation (min): 33 minutes         Chief Complaint: Hip Problem    Visit #: 8    SUBJECTIVE:  Pt reporting he is continuing to do well. Went on a 3 mile walk and felt some fatigue in other hip that disappeared quickly. Notices some leg pain with standing on a narrow ladder; notices this in the muscles in the legs but not in his hips.       OBJECTIVE:  Current objective measures:   See PN    Therapeutic Exercises (CPT 12380):     1. recumbent bike, x5 min L1, cardiovascular warm up    2. review of hep    3. review of goals    4. objective measures      Time-based treatments/modalities:    Physical Therapy Timed Treatment Charges  Therapeutic exercise minutes (CPT 95459): 33 minutes  Pain Comments::  Aggravating: Walking on hard surfaces such as concrete and pavement>2-3 miles, very occasional discomfort with stairs, ladders       ASSESSMENT:   Response to treatment:   See PN     PLAN/RECOMMENDATIONS:   Plan for treatment: See PN

## 2024-04-25 ENCOUNTER — TELEPHONE (OUTPATIENT)
Dept: HEALTH INFORMATION MANAGEMENT | Facility: OTHER | Age: 69
End: 2024-04-25

## 2024-05-22 ENCOUNTER — HOSPITAL ENCOUNTER (OUTPATIENT)
Dept: LAB | Facility: MEDICAL CENTER | Age: 69
End: 2024-05-22
Attending: FAMILY MEDICINE
Payer: MEDICARE

## 2024-05-22 DIAGNOSIS — E78.5 DYSLIPIDEMIA: ICD-10-CM

## 2024-05-22 DIAGNOSIS — Z12.5 SCREENING FOR PROSTATE CANCER: ICD-10-CM

## 2024-05-22 LAB
ALBUMIN SERPL BCP-MCNC: 4.2 G/DL (ref 3.2–4.9)
ALBUMIN/GLOB SERPL: 1.7 G/DL
ALP SERPL-CCNC: 72 U/L (ref 30–99)
ALT SERPL-CCNC: 26 U/L (ref 2–50)
ANION GAP SERPL CALC-SCNC: 12 MMOL/L (ref 7–16)
AST SERPL-CCNC: 23 U/L (ref 12–45)
BASOPHILS # BLD AUTO: 0.8 % (ref 0–1.8)
BASOPHILS # BLD: 0.04 K/UL (ref 0–0.12)
BILIRUB SERPL-MCNC: 1 MG/DL (ref 0.1–1.5)
BUN SERPL-MCNC: 16 MG/DL (ref 8–22)
CALCIUM ALBUM COR SERPL-MCNC: 8.8 MG/DL (ref 8.5–10.5)
CALCIUM SERPL-MCNC: 9 MG/DL (ref 8.5–10.5)
CHLORIDE SERPL-SCNC: 106 MMOL/L (ref 96–112)
CHOLEST SERPL-MCNC: 161 MG/DL (ref 100–199)
CO2 SERPL-SCNC: 23 MMOL/L (ref 20–33)
CREAT SERPL-MCNC: 1.07 MG/DL (ref 0.5–1.4)
EOSINOPHIL # BLD AUTO: 0.06 K/UL (ref 0–0.51)
EOSINOPHIL NFR BLD: 1.3 % (ref 0–6.9)
ERYTHROCYTE [DISTWIDTH] IN BLOOD BY AUTOMATED COUNT: 43.7 FL (ref 35.9–50)
GFR SERPLBLD CREATININE-BSD FMLA CKD-EPI: 74 ML/MIN/1.73 M 2
GLOBULIN SER CALC-MCNC: 2.5 G/DL (ref 1.9–3.5)
GLUCOSE SERPL-MCNC: 86 MG/DL (ref 65–99)
HCT VFR BLD AUTO: 47 % (ref 42–52)
HDLC SERPL-MCNC: 55 MG/DL
HGB BLD-MCNC: 15.7 G/DL (ref 14–18)
IMM GRANULOCYTES # BLD AUTO: 0.01 K/UL (ref 0–0.11)
IMM GRANULOCYTES NFR BLD AUTO: 0.2 % (ref 0–0.9)
LDLC SERPL CALC-MCNC: 89 MG/DL
LYMPHOCYTES # BLD AUTO: 1.26 K/UL (ref 1–4.8)
LYMPHOCYTES NFR BLD: 26.5 % (ref 22–41)
MCH RBC QN AUTO: 31.2 PG (ref 27–33)
MCHC RBC AUTO-ENTMCNC: 33.4 G/DL (ref 32.3–36.5)
MCV RBC AUTO: 93.4 FL (ref 81.4–97.8)
MONOCYTES # BLD AUTO: 0.54 K/UL (ref 0–0.85)
MONOCYTES NFR BLD AUTO: 11.4 % (ref 0–13.4)
NEUTROPHILS # BLD AUTO: 2.84 K/UL (ref 1.82–7.42)
NEUTROPHILS NFR BLD: 59.8 % (ref 44–72)
NRBC # BLD AUTO: 0 K/UL
NRBC BLD-RTO: 0 /100 WBC (ref 0–0.2)
PLATELET # BLD AUTO: 207 K/UL (ref 164–446)
PMV BLD AUTO: 10.1 FL (ref 9–12.9)
POTASSIUM SERPL-SCNC: 4.6 MMOL/L (ref 3.6–5.5)
PROT SERPL-MCNC: 6.7 G/DL (ref 6–8.2)
PSA SERPL-MCNC: 1.32 NG/ML (ref 0–4)
RBC # BLD AUTO: 5.03 M/UL (ref 4.7–6.1)
SODIUM SERPL-SCNC: 141 MMOL/L (ref 135–145)
TRIGL SERPL-MCNC: 86 MG/DL (ref 0–149)
WBC # BLD AUTO: 4.8 K/UL (ref 4.8–10.8)

## 2024-05-23 ENCOUNTER — OFFICE VISIT (OUTPATIENT)
Dept: MEDICAL GROUP | Facility: LAB | Age: 69
End: 2024-05-23
Payer: MEDICARE

## 2024-05-23 VITALS
SYSTOLIC BLOOD PRESSURE: 110 MMHG | HEART RATE: 64 BPM | RESPIRATION RATE: 13 BRPM | WEIGHT: 165.6 LBS | OXYGEN SATURATION: 98 % | DIASTOLIC BLOOD PRESSURE: 64 MMHG | BODY MASS INDEX: 24.53 KG/M2 | HEIGHT: 69 IN | TEMPERATURE: 97.2 F

## 2024-05-23 DIAGNOSIS — H26.9 CATARACT OF BOTH EYES, UNSPECIFIED CATARACT TYPE: ICD-10-CM

## 2024-05-23 DIAGNOSIS — Z00.00 MEDICARE ANNUAL WELLNESS VISIT, SUBSEQUENT: ICD-10-CM

## 2024-05-23 DIAGNOSIS — Z85.51 HISTORY OF BLADDER CANCER: ICD-10-CM

## 2024-05-23 DIAGNOSIS — Z12.83 SKIN CANCER SCREENING: ICD-10-CM

## 2024-05-23 DIAGNOSIS — E78.5 DYSLIPIDEMIA: ICD-10-CM

## 2024-05-23 DIAGNOSIS — H04.123 DRY EYES: ICD-10-CM

## 2024-05-23 PROCEDURE — G0439 PPPS, SUBSEQ VISIT: HCPCS | Performed by: FAMILY MEDICINE

## 2024-05-23 PROCEDURE — 3074F SYST BP LT 130 MM HG: CPT | Performed by: FAMILY MEDICINE

## 2024-05-23 PROCEDURE — 99213 OFFICE O/P EST LOW 20 MIN: CPT | Mod: 25 | Performed by: FAMILY MEDICINE

## 2024-05-23 PROCEDURE — 3078F DIAST BP <80 MM HG: CPT | Performed by: FAMILY MEDICINE

## 2024-05-23 RX ORDER — POLYMYXIN B SULFATE AND TRIMETHOPRIM 1; 10000 MG/ML; [USP'U]/ML
SOLUTION OPHTHALMIC
COMMUNITY
Start: 2024-05-16

## 2024-05-23 SDOH — HEALTH STABILITY: PHYSICAL HEALTH: ON AVERAGE, HOW MANY DAYS PER WEEK DO YOU ENGAGE IN MODERATE TO STRENUOUS EXERCISE (LIKE A BRISK WALK)?: 5 DAYS

## 2024-05-23 SDOH — HEALTH STABILITY: PHYSICAL HEALTH: ON AVERAGE, HOW MANY MINUTES DO YOU ENGAGE IN EXERCISE AT THIS LEVEL?: 60 MIN

## 2024-05-23 SDOH — ECONOMIC STABILITY: INCOME INSECURITY: IN THE LAST 12 MONTHS, WAS THERE A TIME WHEN YOU WERE NOT ABLE TO PAY THE MORTGAGE OR RENT ON TIME?: NO

## 2024-05-23 SDOH — ECONOMIC STABILITY: FOOD INSECURITY: WITHIN THE PAST 12 MONTHS, THE FOOD YOU BOUGHT JUST DIDN'T LAST AND YOU DIDN'T HAVE MONEY TO GET MORE.: NEVER TRUE

## 2024-05-23 SDOH — ECONOMIC STABILITY: FOOD INSECURITY: WITHIN THE PAST 12 MONTHS, YOU WORRIED THAT YOUR FOOD WOULD RUN OUT BEFORE YOU GOT MONEY TO BUY MORE.: NEVER TRUE

## 2024-05-23 SDOH — ECONOMIC STABILITY: HOUSING INSECURITY: IN THE LAST 12 MONTHS, HOW MANY PLACES HAVE YOU LIVED?: 1

## 2024-05-23 SDOH — ECONOMIC STABILITY: INCOME INSECURITY: HOW HARD IS IT FOR YOU TO PAY FOR THE VERY BASICS LIKE FOOD, HOUSING, MEDICAL CARE, AND HEATING?: NOT HARD AT ALL

## 2024-05-23 ASSESSMENT — SOCIAL DETERMINANTS OF HEALTH (SDOH)
HOW OFTEN DO YOU HAVE SIX OR MORE DRINKS ON ONE OCCASION: NEVER
HOW MANY DRINKS CONTAINING ALCOHOL DO YOU HAVE ON A TYPICAL DAY WHEN YOU ARE DRINKING: 1 OR 2
HOW OFTEN DO YOU HAVE A DRINK CONTAINING ALCOHOL: MONTHLY OR LESS
HOW OFTEN DO YOU ATTENT MEETINGS OF THE CLUB OR ORGANIZATION YOU BELONG TO?: NEVER
HOW OFTEN DO YOU GET TOGETHER WITH FRIENDS OR RELATIVES?: THREE TIMES A WEEK
HOW HARD IS IT FOR YOU TO PAY FOR THE VERY BASICS LIKE FOOD, HOUSING, MEDICAL CARE, AND HEATING?: NOT HARD AT ALL
HOW OFTEN DO YOU ATTEND CHURCH OR RELIGIOUS SERVICES?: NEVER
HOW OFTEN DO YOU ATTEND CHURCH OR RELIGIOUS SERVICES?: NEVER
HOW OFTEN DO YOU GET TOGETHER WITH FRIENDS OR RELATIVES?: THREE TIMES A WEEK
WITHIN THE PAST 12 MONTHS, YOU WORRIED THAT YOUR FOOD WOULD RUN OUT BEFORE YOU GOT THE MONEY TO BUY MORE: NEVER TRUE
DO YOU BELONG TO ANY CLUBS OR ORGANIZATIONS SUCH AS CHURCH GROUPS UNIONS, FRATERNAL OR ATHLETIC GROUPS, OR SCHOOL GROUPS?: NO
HOW OFTEN DO YOU ATTENT MEETINGS OF THE CLUB OR ORGANIZATION YOU BELONG TO?: NEVER
IN A TYPICAL WEEK, HOW MANY TIMES DO YOU TALK ON THE PHONE WITH FAMILY, FRIENDS, OR NEIGHBORS?: MORE THAN THREE TIMES A WEEK
IN A TYPICAL WEEK, HOW MANY TIMES DO YOU TALK ON THE PHONE WITH FAMILY, FRIENDS, OR NEIGHBORS?: MORE THAN THREE TIMES A WEEK
DO YOU BELONG TO ANY CLUBS OR ORGANIZATIONS SUCH AS CHURCH GROUPS UNIONS, FRATERNAL OR ATHLETIC GROUPS, OR SCHOOL GROUPS?: NO

## 2024-05-23 ASSESSMENT — FIBROSIS 4 INDEX: FIB4 SCORE: 1.55

## 2024-05-23 ASSESSMENT — LIFESTYLE VARIABLES
HOW MANY STANDARD DRINKS CONTAINING ALCOHOL DO YOU HAVE ON A TYPICAL DAY: 1 OR 2
AUDIT-C TOTAL SCORE: 1
HOW OFTEN DO YOU HAVE SIX OR MORE DRINKS ON ONE OCCASION: NEVER
SKIP TO QUESTIONS 9-10: 1
HOW OFTEN DO YOU HAVE A DRINK CONTAINING ALCOHOL: MONTHLY OR LESS

## 2024-05-23 ASSESSMENT — PATIENT HEALTH QUESTIONNAIRE - PHQ9: CLINICAL INTERPRETATION OF PHQ2 SCORE: 0

## 2024-05-23 NOTE — PROGRESS NOTES
"Subjective:     CC: Dry eyes, vision changes    HPI:   Jareth presents today returns regarding dry eyes and vision.  He has a previous diagnosis of cataract through optometry but has never been evaluated by ophthalmology.  Recently he is noticing issues with bilateral eye dryness but also some increasing cloudiness in the vision of the right eye.  He recently was seen by [facility] for possible conjunctivitis and has been using Polytrim drops but these have not helped.  He does have some mild redness to both eyes.  He does see some improvement with artificial teardrops.      Medications, past medical history, allergies, and social history have been reviewed and updated.      Objective:       Exam:  /64   Pulse 64   Temp 36.2 °C (97.2 °F) (Temporal)   Resp 13   Ht 1.753 m (5' 9\")   Wt 75.1 kg (165 lb 9.6 oz)   SpO2 98%   BMI 24.45 kg/m²  Body mass index is 24.45 kg/m².    Hearing good.    Dentition good  Alert, oriented in no acute distress.  Eye contact is good, speech goal directed, affect calm  Eyes: EOMI, right eye is slightly less reactive to light as compared to the left.  Conjunctiva are mildly injected.  Vision is grossly intact.  CV: RRR  Lungs: CTAB  Ext: No edema      Assessment & Plan:     69 y.o. male with the following -     1. Cataract of both eyes, unspecified cataract type  2. Dry eyes    Chronic dry eyes along with worsening cloudiness to the vision of the right eye.  Does have prior diagnosis of cataracts through optometry but was never evaluated by ophthalmology.  Suspect he may have worsening cataract along with some chronic dry eyes.  I recommend he continue to use artificial tears for the dry eyes and referral is placed to establish with ophthalmology.    - Referral to Ophthalmology         Please note that this note was created using voice recognition software.      "

## 2024-05-23 NOTE — PROGRESS NOTES
Chief Complaint   Patient presents with    Annual Exam     Patient is here today for an annual exam. Patient states he would like a referral to see the eye doctor. Patient states his eye has been bothering him for some time.        HPI:  Jareth Sunshine is a 69 y.o. here for Medicare Annual Wellness Visit     Has concerns today about eyes.  See separate visit note.    Patient Active Problem List    Diagnosis Date Noted    History of bladder cancer 12/21/2021    Dyslipidemia 01/22/2018       Current Outpatient Medications   Medication Sig Dispense Refill    polymixin-trimethoprim (POLYTRIM) 74671-9.1 UNIT/ML-% Solution       atorvastatin (LIPITOR) 10 MG Tab TAKE ONE TABLET BY MOUTH EVERY  Tablet 4     No current facility-administered medications for this visit.          Current supplements as per medication list.     Allergies: Patient has no known allergies.    Current social contact/activities: patient states not really.      He  reports that he has never smoked. He has never used smokeless tobacco. He reports current alcohol use. He reports that he does not use drugs.  Counseling given: Not Answered      ROS:    Gait: Uses no assistive device  Ostomy: No  Other tubes: No  Amputations: No  Chronic oxygen use: No  Last eye exam: 09/2023  Wears hearing aids: No   : Denies any urinary leakage during the last 6 months    Screening:  Negative  Depression Screening  Little interest or pleasure in doing things?  0 - not at all  Feeling down, depressed , or hopeless? 0 - not at all  Trouble falling or staying asleep, or sleeping too much?     Feeling tired or having little energy?     Poor appetite or overeating?     Feeling bad about yourself - or that you are a failure or have let yourself or your family down?    Trouble concentrating on things, such as reading the newspaper or watching television?    Moving or speaking so slowly that other people could have noticed.  Or the opposite - being so fidgety or  restless that you have been moving around a lot more than usual?     Thoughts that you would be better off dead, or of hurting yourself?     Patient Health Questionnaire Score:      If depressive symptoms identified deferred to follow up visit unless specifically addressed in assessment and plan.    Interpretation of PHQ-9 Total Score   Score Severity   1-4 No Depression   5-9 Mild Depression   10-14 Moderate Depression   15-19 Moderately Severe Depression   20-27 Severe Depression    Screening for Cognitive Impairment  Do you or any of your friends or family members have any concern about your memory?  NO  Three Minute Recall (Leader, Season, Table) 3 /3    Josue clock face with all 12 numbers and set the hands to show 10 minutes after 11.      yes  Cognitive concerns identified deferred for follow up unless specifically addressed in assessment and plan.    Fall Risk Assessment  Has the patient had two or more falls in the last year or any fall with injury in the last year?  NO     Safety Assessment  Do you always wear your seatbelt?   Yes  Any changes to home needed to function safely? NO   Difficulty hearing.     No  Patient counseled about all safety risks that were identified.    Functional Assessment ADLs  Are there any barriers preventing you from cooking for yourself or meeting nutritional needs?   . No   Are there any barriers preventing you from driving safely or obtaining transportation?    No.    Are there any barriers preventing you from using a telephone or calling for help?   No    Are there any barriers preventing you from shopping?   .No    Are there any barriers preventing you from taking care of your own finances?    No   Are there any barriers preventing you from managing your medications?   No    Are there any barriers preventing you from showering, bathing or dressing yourself?  No    Are there any barriers preventing you from doing housework or laundry?   No   Are there any barriers preventing  you from using the toilet?  No   Are you currently engaging in any exercise or physical activity?   Yes      Self-Assessment of Health  What is your perception of your health?   good   Do you sleep more than six hours a night?   Yes   In the past 7 days, how much did pain keep you from doing your normal work?   None   Do you spend quality time with family or friends (virtually or in person)?   yes   Do you usually eat a heart healthy diet that constists of a variety of fruits, vegetables, whole grains and fiber?   yes   Do you eat foods high in fat and/or Fast Food more than three times per week?   No    How concerned are you that your medical conditions are not being well managed?   none   Are you worried that in the next 2 months, you may not have stable housing that you own, rent, or stay in as part of a household?   No       Advance Care Planning  Do you have an Advance Directive, Living Will, Durable Power of , or POLST?   Yes - will bring in copy                 Health Maintenance Summary            Overdue - Annual Wellness Visit (Yearly) Overdue since 3/23/2024      03/23/2023  Visit Dx: Medicare annual wellness visit, subsequent    03/23/2023  Level of Service: UT ANNUAL WELLNESS VISIT-INCLUDES PPPS SUBSEQUE*    03/19/2021  Level of Service: UT PREVENTIVE VISIT,EST,65 & OVER    03/19/2020  Level of Service: UT PREVENTIVE VISIT,EST,65 & OVER    03/14/2019  Level of Service: PB PBEVENTIVE VISIT,EST,65 & OVER    Only the first 5 history entries have been loaded, but more history exists.              Colorectal Cancer Screening (Colonoscopy - Every 8 Years) Next due on 5/31/2026 05/31/2018  REFERRAL TO GI FOR COLONOSCOPY              IMM DTaP/Tdap/Td Vaccine (3 - Td or Tdap) Next due on 7/29/2033 07/29/2023  Imm Admin: Tdap Vaccine    04/23/2018  Imm Admin: Tdap Vaccine              Hepatitis C Screening  Tentatively Complete      05/07/2018  Hepatitis C Antibody component of HEP C VIRUS  ANTIBODY              Pneumococcal Vaccine: 65+ Years (Series Information) Completed      03/19/2020  Imm Admin: Pneumococcal polysaccharide vaccine (PPSV-23)    04/23/2018  Imm Admin: Pneumococcal Conjugate Vaccine (Prevnar/PCV-13)              Zoster (Shingles) Vaccines (Series Information) Completed      01/06/2021  Imm Admin: Zoster Vaccine Recombinant (RZV) (SHINGRIX)    10/16/2020  Imm Admin: Zoster Vaccine Recombinant (RZV) (SHINGRIX)              Influenza Vaccine (Series Information) Completed      11/08/2023  Imm Admin: Influenza Vaccine Adult HD    09/27/2022  Imm Admin: Influenza Vaccine Adult HD    12/30/2021  Imm Admin: Influenza Vaccine Adult HD    10/22/2020  Imm Admin: Influenza Vaccine Adult HD    03/19/2020  Imm Admin: Influenza Vaccine Adult HD    Only the first 5 history entries have been loaded, but more history exists.              COVID-19 Vaccine (Series Information) Completed      12/12/2023  Imm Admin: Comirnaty (Covid-19 Vaccine, Mrna, 0813-0845 Formula)    09/30/2022  Imm Admin: PFIZER BIVALENT SARS-COV-2 VACCINE (12+)    05/23/2022  Imm Admin: PFIZER EDMOND CAP SARS-COV-2 VACCINATION (12+)    09/30/2021  Imm Admin: PFIZER PURPLE CAP SARS-COV-2 VACCINATION (12+)    03/21/2021  Imm Admin: PFIZER PURPLE CAP SARS-COV-2 VACCINATION (12+)    Only the first 5 history entries have been loaded, but more history exists.              Hepatitis A Vaccine (Hep A) (Series Information) Aged Out      No completion history exists for this topic.              Hepatitis B Vaccine (Hep B) (Series Information) Aged Out      No completion history exists for this topic.              HPV Vaccines (Series Information) Aged Out      No completion history exists for this topic.              Polio Vaccine (Inactivated Polio) (Series Information) Aged Out      No completion history exists for this topic.              Meningococcal Immunization (Series Information) Aged Out      No completion history exists for this  "topic.                    Patient Care Team:  Chandana Hummel M.D. as PCP - General (Family Medicine)  Jim Murphy M.D. as Consulting Physician (Urology)  Zahra Murphy C.N.A. as [facility]   Simba Andino, PT as Physical Therapist (Physical Therapy)      Social History     Tobacco Use    Smoking status: Never    Smokeless tobacco: Never   Vaping Use    Vaping status: Never Used   Substance Use Topics    Alcohol use: Yes     Comment: 0-2 per month    Drug use: No     Family History   Problem Relation Age of Onset    Arthritis Mother     No Known Problems Father     Diabetes Sister      He  has a past medical history of Cancer (HCC), Hyperlipidemia, and Renal disorder.   Past Surgical History:   Procedure Laterality Date    CYSTOSCOPY N/A 6/6/2019    Procedure: CYSTOSCOPY;  Surgeon: Jim Murphy M.D.;  Location: SURGERY [facility];  Service: Urology    TRANS URETHRAL RESECTION BLADDER N/A 6/6/2019    Procedure: TRANS URETHRAL RESECTION BLADDER-  TUMOR;  Surgeon: Jim Murphy M.D.;  Location: SURGERY [facility];  Service: Urology    OTHER      Bladder laser surgery for cancer    OTHER      Jaw surgery 35 years ago       Exam:   /64   Pulse 64   Temp 36.2 °C (97.2 °F) (Temporal)   Resp 13   Ht 1.753 m (5' 9\")   Wt 75.1 kg (165 lb 9.6 oz)   SpO2 98%  Body mass index is 24.45 kg/m².    Hearing good.    Dentition good  Alert, oriented in no acute distress.  Eye contact is good, speech goal directed, affect calm  Eyes: EOMI, right eye is slightly less reactive to light as compared to the left.  Conjunctiva are mildly injected.  Vision is grossly intact.  CV: RRR  Lungs: CTAB  Ext: No edema    Assessment and Plan. The following treatment and monitoring plan is recommended:      1. Medicare annual wellness visit, subsequent    2. Skin cancer screening  Plans to establish with dermatology for skin cancer screening exam.  - Referral to Dermatology    3. " Dyslipidemia  Continue Lipitor 10 mg daily.  Recent lipids are at goal.    4. History of bladder cancer  Was following with urology but no further surveillance needed, this was treated in 2012. He will return to see urology if he develops any new symptoms.     5. Cataract of both eyes, unspecified cataract type    6. Dry eyes  - Referral to Ophthalmology    Other orders  - polymixin-trimethoprim (POLYTRIM) 63665-0.1 UNIT/ML-% Solution    Services suggested: No services needed at this time  Health Care Screening: Age-appropriate preventive services recommended by USPTF and ACIP covered by Medicare were discussed today. Services ordered if indicated and agreed upon by the patient.  Referrals offered: Community-based lifestyle interventions to reduce health risks and promote self-management and wellness, fall prevention, nutrition, physical activity, tobacco-use cessation, weight loss, and mental health services as per orders if indicated.    Discussion today about general wellness and lifestyle habits:    Prevent falls and reduce trip hazards; Cautioned about securing or removing rugs.  Have a working fire alarm and carbon monoxide detector;   Engage in regular physical activity and social activities     Follow-up: No follow-ups on file.

## 2024-05-28 ENCOUNTER — APPOINTMENT (OUTPATIENT)
Dept: MEDICAL GROUP | Facility: LAB | Age: 69
End: 2024-05-28
Payer: MEDICARE

## 2024-06-13 ENCOUNTER — OFFICE VISIT (OUTPATIENT)
Dept: DERMATOLOGY | Facility: IMAGING CENTER | Age: 69
End: 2024-06-13
Payer: MEDICARE

## 2024-06-13 DIAGNOSIS — Z12.83 SKIN CANCER SCREENING: ICD-10-CM

## 2024-06-13 DIAGNOSIS — L81.4 LENTIGINES: ICD-10-CM

## 2024-06-13 DIAGNOSIS — L82.1 SK (SEBORRHEIC KERATOSIS): ICD-10-CM

## 2024-06-13 DIAGNOSIS — D22.9 MULTIPLE NEVI: ICD-10-CM

## 2024-06-13 DIAGNOSIS — D18.01 CHERRY ANGIOMA: ICD-10-CM

## 2024-06-13 DIAGNOSIS — L57.0 ACTINIC KERATOSIS: ICD-10-CM

## 2024-06-13 PROCEDURE — 99213 OFFICE O/P EST LOW 20 MIN: CPT | Mod: 25 | Performed by: NURSE PRACTITIONER

## 2024-06-13 PROCEDURE — 17003 DESTRUCT PREMALG LES 2-14: CPT | Performed by: NURSE PRACTITIONER

## 2024-06-13 PROCEDURE — 17000 DESTRUCT PREMALG LESION: CPT | Performed by: NURSE PRACTITIONER

## 2024-06-13 NOTE — PROGRESS NOTES
DERMATOLOGY NOTE  NEW VISIT       Chief complaint: Establish Care (PASCUAL )       PASCUAL   No concerns today     History of skin cancer: No  History of precancers/actinic keratoses: Yes, Details: scalp   History of biopsies:No  History of blistering/severe sunburns:Yes, Details: teenager   Family history of skin cancer:No  Family history of atypical moles:No      No Known Allergies     MEDICATIONS:  Medications relevant to specialty reviewed.     REVIEW OF SYSTEMS:   Positive for skin (see HPI)  Negative for fevers and chills       EXAM:  There were no vitals taken for this visit.  Constitutional: Well-developed, well-nourished, and in no distress.     A total body skin exam was performed excluding the genitals per patient preference and including the following areas: head (including face), neck, chest, abdomen, groin/buttocks, back, bilateral upper extremities, and bilateral lower extremities with the following pertinent findings listed below and/or in assessment/plan.     Ill-defined erythematous gritty/scaly papules over the scalp--vertex and crown  -sun exposed skin of trunk and b/l upper, lower extremities and face with scattered clinically benign light brown reticulated macules all of which were morphologically similar and none of which were suspicious for skin cancer today on exam  Several scattered 1-3mm bright red macules and thin papules on the trunk and extremities  Very few tan skin-colored stuck-on waxy papules scattered on the trunk and extremities  Multiple light brown medium brown skin-colored macules papules scattered over the trunk >> extremities--All with benign-appearing pigment network patterns on dermoscopy      IMPRESSION / PLAN:    1. Actinic keratosis  CRYOTHERAPY:  Risks (including, but not limited to: skin discoloration, redness, blister, blood blister, recurrence, need for further treatment, infection, scar) and benefits of cryotherapy discussed. Patient verbally agreed to proceed with  treatment. 1 cryotherapy freeze thaw cycles of 10 seconds were applied to 2 lesions on scalp as noted on exam with cryac. Patient tolerated procedure well. Aftercare instructions given--no specific care needed unless irritated during healing process, can apply Vaseline with small band-aid if needed.      2. Lentigines  - Benign-appearing nature of lesions discussed during exam.   - Advised to continue to monitor for any return to clinic for new or concerning changes.      3. Cherry angioma  - Benign-appearing nature of lesions discussed during exam.   - Advised to continue to monitor for any return to clinic for new or concerning changes.      4. SK (seborrheic keratosis)  - Benign-appearing nature of lesions discussed during exam.   - Advised to continue to monitor for any return to clinic for new or concerning changes.      5. Multiple nevi  - Benign-appearing nature of lesions discussed during exam.   - Advised to continue to monitor for any return to clinic for new or concerning changes.  - ABCDE's of melanoma discussed/handout given      6. Skin cancer screening  Skin cancer education  discussed importance of sun protective clothing, eyewear in addition to the use of broad spectrum sunscreen with SPF 30 or greater, as well as need for reapplication ~every 2 hours when exposed to UVR/handout given  discussed importance following up for any new or changing lesions as noted in handout given, but every 12 months exams in clinic in the setting of dermatologic history  ABCDE's of melanoma discussed/handout given        Pt understands risks associated with LN2, Patient verbalized understanding and agrees with plan regarding the above      Please note that this dictation was created using voice recognition software. I have made every reasonable attempt to correct obvious errors, but I expect that there are errors of grammar and possibly content that I did not discover before finalizing the note.      Return to clinic  in: Return in about 1 year (around 6/13/2025) for PASCUAL. and as needed for any new or changing skin lesions.

## 2024-11-01 RX ORDER — ATORVASTATIN CALCIUM 10 MG/1
TABLET, FILM COATED ORAL
Qty: 100 TABLET | Refills: 1 | Status: SHIPPED | OUTPATIENT
Start: 2024-11-01

## 2024-11-01 NOTE — TELEPHONE ENCOUNTER
Received request via: Pharmacy    Was the patient seen in the last year in this department? Yes    Does the patient have an active prescription (recently filled or refills available) for medication(s) requested? No    [facility]    Does the patient have intermediate Plus and need 100-day supply? (This applies to ALL medications) Yes, quantity updated to 100 days

## 2025-05-20 RX ORDER — ATORVASTATIN CALCIUM 10 MG/1
10 TABLET, FILM COATED ORAL
Qty: 100 TABLET | Refills: 0 | Status: SHIPPED | OUTPATIENT
Start: 2025-05-20

## 2025-05-20 NOTE — TELEPHONE ENCOUNTER
Received request via: Pharmacy    Was the patient seen in the last year in this department? Yes    Does the patient have an active prescription (recently filled or refills available) for medication(s) requested? No    Pharmacy Name: [facility]     Does the patient have [facility] Plus and need 100-day supply? (This applies to ALL medications) Yes, quantity updated to 100 days

## 2025-05-27 ENCOUNTER — APPOINTMENT (OUTPATIENT)
Dept: MEDICAL GROUP | Facility: LAB | Age: 70
End: 2025-05-27
Payer: MEDICARE

## 2025-05-27 VITALS
HEIGHT: 69 IN | DIASTOLIC BLOOD PRESSURE: 60 MMHG | RESPIRATION RATE: 12 BRPM | BODY MASS INDEX: 24.76 KG/M2 | TEMPERATURE: 97.4 F | HEART RATE: 72 BPM | WEIGHT: 167.2 LBS | SYSTOLIC BLOOD PRESSURE: 122 MMHG | OXYGEN SATURATION: 97 %

## 2025-05-27 DIAGNOSIS — H26.9 CATARACT OF RIGHT EYE, UNSPECIFIED CATARACT TYPE: ICD-10-CM

## 2025-05-27 DIAGNOSIS — B35.6 TINEA CRURIS: ICD-10-CM

## 2025-05-27 DIAGNOSIS — E78.5 DYSLIPIDEMIA: ICD-10-CM

## 2025-05-27 DIAGNOSIS — H40.9 GLAUCOMA OF RIGHT EYE, UNSPECIFIED GLAUCOMA TYPE: ICD-10-CM

## 2025-05-27 DIAGNOSIS — Z85.51 HISTORY OF BLADDER CANCER: ICD-10-CM

## 2025-05-27 DIAGNOSIS — Z00.00 MEDICARE ANNUAL WELLNESS VISIT, SUBSEQUENT: Primary | ICD-10-CM

## 2025-05-27 RX ORDER — CLOTRIMAZOLE AND BETAMETHASONE DIPROPIONATE 10; .64 MG/G; MG/G
1 CREAM TOPICAL 2 TIMES DAILY
Qty: 15 G | Refills: 1 | Status: SHIPPED | OUTPATIENT
Start: 2025-05-27

## 2025-05-27 ASSESSMENT — ENCOUNTER SYMPTOMS: GENERAL WELL-BEING: GOOD

## 2025-05-27 ASSESSMENT — ACTIVITIES OF DAILY LIVING (ADL): BATHING_REQUIRES_ASSISTANCE: 0

## 2025-05-27 ASSESSMENT — FIBROSIS 4 INDEX: FIB4 SCORE: 1.568929081105472255

## 2025-05-27 ASSESSMENT — PATIENT HEALTH QUESTIONNAIRE - PHQ9: CLINICAL INTERPRETATION OF PHQ2 SCORE: 0

## 2025-05-27 NOTE — PROGRESS NOTES
Chief Complaint   Patient presents with    Annual Exam       HPI:  Jareth Sunshine is a 70 y.o. here for Medicare Annual Wellness Visit     Patient Active Problem List    Diagnosis Date Noted    History of bladder cancer 12/21/2021    Dyslipidemia 01/22/2018       Current Medications[1]       Current supplements as per medication list.     Allergies: Patient has no known allergies.    Current social contact/activities: family, travel     He  reports that he has never smoked. He has never used smokeless tobacco. He reports current alcohol use. He reports that he does not use drugs.  Counseling given: Not Answered      ROS:    Gait: Uses no assistive device  Ostomy: No  Other tubes: No  Amputations: No  Chronic oxygen use: No  Last eye exam: following regularly with optometry/Optho  Wears hearing aids: No   : Denies any urinary leakage during the last 6 months    Screening:    Depression Screening  Little interest or pleasure in doing things?  0 - not at all  Feeling down, depressed , or hopeless? 0 - not at all  Patient Health Questionnaire Score: 0     If depressive symptoms identified deferred to follow up visit unless specifically addressed in assessment and plan.    Interpretation of PHQ-9 Total Score   Score Severity   1-4 No Depression   5-9 Mild Depression   10-14 Moderate Depression   15-19 Moderately Severe Depression   20-27 Severe Depression    Screening for Cognitive Impairment  Do you or any of your friends or family members have any concern about your memory? No  Three Minute Recall (Village, Kitchen, Baby) 3/3    Josue clock face with all 12 numbers and set the hands to show 10 minutes past 11.  Yes    Cognitive concerns identified deferred for follow up unless specifically addressed in assessment and plan.    Fall Risk Assessment  Has the patient had two or more falls in the last year or any fall with injury in the last year?  No    Safety Assessment  Do you always wear your seatbelt?   Yes  Any changes to home needed to function safely? No  Difficulty hearing.  No  Patient counseled about all safety risks that were identified.    Functional Assessment ADLs  Are there any barriers preventing you from cooking for yourself or meeting nutritional needs?  No.    Are there any barriers preventing you from driving safely or obtaining transportation?  No.    Are there any barriers preventing you from using a telephone or calling for help?  No    Are there any barriers preventing you from shopping?  No.    Are there any barriers preventing you from taking care of your own finances?  No    Are there any barriers preventing you from managing your medications?  No    Are there any barriers preventing you from showering, bathing or dressing yourself? No    Are there any barriers preventing you from doing housework or laundry? No  Are there any barriers preventing you from using the toilet?No  Are you currently engaging in any exercise or physical activity?  No.      Self-Assessment of Health  What is your perception of your health? Good    Do you sleep more than six hours a night? Yes    In the past 7 days, how much did pain keep you from doing your normal work? None    Do you spend quality time with family or friends (virtually or in person)? Yes    Do you usually eat a heart healthy diet that constists of a variety of fruits, vegetables, whole grains and fiber? Yes    Do you eat foods high in fat and/or Fast Food more than three times per week? No    How concerned are you that your medical conditions are not being well managed? Not at all    Are you worried that in the next 2 months, you may not have stable housing that you own, rent, or stay in as part of a household? No      Advance Care Planning  Do you have an Advance Directive, Living Will, Durable Power of , or POLST? Yes    Living Will     is not on file - instructed patient to bring in a copy to scan into their chart      Health Maintenance  Summary            Current Care Gaps       COVID-19 Vaccine (7 - 2024-25 season) Overdue since 9/1/2024 12/12/2023  Imm Admin: Comirnaty (Covid-19 Vaccine, Mrna, [id number] Formula)    09/30/2022  Imm Admin: PFIZER BIVALENT SARS-COV-2 VACCINE (12+)    05/23/2022  Imm Admin: PFIZER EDMOND CAP SARS-COV-2 VACCINATION (12+)    09/30/2021  Imm Admin: PFIZER PURPLE CAP SARS-COV-2 VACCINATION (12+)    03/21/2021  Imm Admin: PFIZER PURPLE CAP SARS-COV-2 VACCINATION (12+)     Only the first 5 history entries have been loaded, but more history exists.            Annual Wellness Visit (Yearly) Overdue since 5/23/2025 05/23/2024  Level of Service: ANNUAL WELLNESS VISIT-INCLUDES PPPS SUBSEQUE*    05/23/2024  Visit Dx: Medicare annual wellness visit, subsequent    03/23/2023  Level of Service: [place] ANNUAL WELLNESS VISIT-INCLUDES PPPS SUBSEQUE*    03/23/2023  Visit Dx: Medicare annual wellness visit, subsequent    03/19/2021  Level of Service: [place] PREVENTIVE VISIT,EST,65 & OVER      Only the first 5 history entries have been loaded, but more history exists.                      Needs Review       Hepatitis C Screening  Tentatively Complete      05/07/2018  Hepatitis C Antibody component of HEP C VIRUS ANTIBODY                      Upcoming       Colorectal Cancer Screening (Colonoscopy - Preferred) Next due on 5/31/2028 05/31/2018  REFERRAL TO GI FOR COLONOSCOPY              IMM DTaP/Tdap/Td Vaccine (3 - Td or Tdap) Next due on 7/29/2033 07/29/2023  Imm Admin: Tdap Vaccine    04/23/2018  Imm Admin: Tdap Vaccine                      Completed or No Longer Recommended       Influenza Vaccine (Series Information) Completed      10/28/2024  Outside Immunization: Influenza, High Dose    11/08/2023  Imm Admin: Influenza Vaccine Adult HD    09/27/2022  Imm Admin: Influenza Vaccine Adult HD    12/30/2021  Imm Admin: Influenza Vaccine Adult HD    10/22/2020  Imm Admin: Influenza Vaccine Adult HD      Only the first 5 history  "entries have been loaded, but more history exists.              Zoster (Shingles) Vaccines (Series Information) Completed      01/06/2021  Imm Admin: Zoster Vaccine Recombinant (RZV) (SHINGRIX)    10/16/2020  Imm Admin: Zoster Vaccine Recombinant (RZV) (SHINGRIX)              Pneumococcal Vaccine: 50+ Years (Series Information) Completed      03/19/2020  Imm Admin: Pneumococcal polysaccharide vaccine (PPSV-23)    04/23/2018  Imm Admin: Pneumococcal Conjugate Vaccine (Prevnar/PCV-13)              Hepatitis A Vaccine (Hep A) (Series Information) Aged Out      No completion history exists for this topic.              Hepatitis B Vaccine (Hep B) (Series Information) Aged Out     No completion history exists for this topic.              HPV Vaccines (Series Information) Aged Out     No completion history exists for this topic.              Polio Vaccine (Inactivated Polio) (Series Information) Aged Out     No completion history exists for this topic.              Meningococcal Immunization (Series Information) Aged Out     No completion history exists for this topic.              Meningococcal B Vaccine (Series Information) Aged Out     No completion history exists for this topic.                            Patient Care Team:  Chandana Hummel M.D. as PCP - General (Family Medicine)  Jim Murphy M.D. as Consulting Physician (Urology)  Zahra Murphy C.N.A. as [facility]   Simba Andino, PT as Physical Therapist (Physical Therapy)        Social History[2]  Family History   Problem Relation Age of Onset    Arthritis Mother     No Known Problems Father     Diabetes Sister      He  has a past medical history of Cancer (HCC), Hyperlipidemia, and Renal disorder.   Past Surgical History[3]    Exam:   /60 (BP Location: Right arm, Patient Position: Sitting, BP Cuff Size: Adult)   Pulse 72   Temp 36.3 °C (97.4 °F) (Temporal)   Resp 12   Ht 1.753 m (5' 9\")   Wt 75.8 kg (167 lb " 3.2 oz)   SpO2 97%  Body mass index is 24.69 kg/m².    Hearing good.    Dentition good  Alert, oriented in no acute distress.  Eye contact is good, speech goal directed, affect calm  CV: RRR  Lungs: CTAB    Assessment and Plan. The following treatment and monitoring plan is recommended:      1. Medicare annual wellness visit, subsequent    2. Glaucoma of right eye, unspecified glaucoma type  3. Cataract of right eye, unspecified cataract type  Following regularly with eye care professionals.  - CBC WITH DIFFERENTIAL; Future  - Comp Metabolic Panel; Future    4. Dyslipidemia  Continue atorvastatin 10 mg daily, follow lipids  - CBC WITH DIFFERENTIAL; Future  - Comp Metabolic Panel; Future  - Lipid Profile; Future    5. Tinea cruris  He occasionally uses Lotrisone cream for a rash that appears on the buttocks.  - clotrimazole-betamethasone (LOTRISONE) 1-0.05 % Cream; Apply 1 Application topically 2 times a day.  Dispense: 15 g; Refill: 1    6. History of bladder cancer  Previously followed with urology but no further surveillance recommended.    Other orders  - LATANOPROST OP; Administer  into affected eye(s).      Services suggested: No services needed at this time  Health Care Screening: Age-appropriate preventive services recommended by USPTF and ACIP covered by Medicare were discussed today. Services ordered if indicated and agreed upon by the patient.  Referrals offered: Community-based lifestyle interventions to reduce health risks and promote self-management and wellness, fall prevention, nutrition, physical activity, tobacco-use cessation, weight loss, and mental health services as per orders if indicated.    Discussion today about general wellness and lifestyle habits:    Prevent falls and reduce trip hazards; Cautioned about securing or removing rugs.  Have a working fire alarm and carbon monoxide detector;   Engage in regular physical activity and social activities     Follow-up: Return in about 1 year  (around 5/27/2026), or if symptoms worsen or fail to improve.         [1]   Current Outpatient Medications   Medication Sig Dispense Refill    LATANOPROST OP Administer  into affected eye(s).      atorvastatin (LIPITOR) 10 MG Tab TAKE ONE TABLET BY MOUTH EVERY  Tablet 0    polymixin-trimethoprim (POLYTRIM) 05418-9.1 UNIT/ML-% Solution        No current facility-administered medications for this visit.   [2]   Social History  Tobacco Use    Smoking status: Never    Smokeless tobacco: Never   Vaping Use    Vaping status: Never Used   Substance Use Topics    Alcohol use: Yes     Comment: 0-2 per month    Drug use: No   [3]   Past Surgical History:  Procedure Laterality Date    CYSTOSCOPY N/A 6/6/2019    Procedure: CYSTOSCOPY;  Surgeon: Jim Murphy M.D.;  Location: SURGERY [facility];  Service: Urology    TRANS URETHRAL RESECTION BLADDER N/A 6/6/2019    Procedure: TRANS URETHRAL RESECTION BLADDER-  TUMOR;  Surgeon: Jim Murphy M.D.;  Location: SURGERY [facility];  Service: Urology    OTHER      Bladder laser surgery for cancer    OTHER      Jaw surgery 35 years ago

## 2025-06-09 ENCOUNTER — TELEPHONE (OUTPATIENT)
Dept: HEALTH INFORMATION MANAGEMENT | Facility: OTHER | Age: 70
End: 2025-06-09
Payer: MEDICARE

## (undated) DEVICE — MASK ANESTHESIA ADULT  - (100/CA)

## (undated) DEVICE — CORD ELECTROSURG. TUR DISP (50EA/CA)

## (undated) DEVICE — ELECTRODE 850 FOAM ADHESIVE - HYDROGEL RADIOTRNSPRNT (50/PK)

## (undated) DEVICE — WATER IRRIG. STER. 1500 ML - (9/CA)

## (undated) DEVICE — SET IRRIGATION CYSTOSCOPY Y-TYPE L81 IN (20EA/CA)

## (undated) DEVICE — GOWN SURGEONS X-LARGE - DISP. (30/CA)

## (undated) DEVICE — SET EXTENSION WITH 2 PORTS (48EA/CA) ***PART #2C8610 IS A SUBSTITUTE*****

## (undated) DEVICE — MASK, LARYNGEAL AIRWAY #4

## (undated) DEVICE — NEPTUNE 4 PORT MANIFOLD - (20/PK)

## (undated) DEVICE — KIT ROOM DECONTAMINATION

## (undated) DEVICE — SET LEADWIRE 5 LEAD BEDSIDE DISPOSABLE ECG (1SET OF 5/EA)

## (undated) DEVICE — GLOVE BIOGEL SZ 7.5 SURGICAL PF LTX - (50PR/BX 4BX/CA)

## (undated) DEVICE — TUBING CLEARLINK DUO-VENT - C-FLO (48EA/CA)

## (undated) DEVICE — PROTECTOR ULNA NERVE - (36PR/CA)

## (undated) DEVICE — TUBE CONNECT SUCTION CLEAR 120 X 1/4" (50EA/CA)"

## (undated) DEVICE — SUCTION INSTRUMENT YANKAUER BULBOUS TIP W/O VENT (50EA/CA)

## (undated) DEVICE — EVACUATOR BLADDER ELLIK - (10/BX)

## (undated) DEVICE — ELECTRODE DUAL RETURN W/ CORD - (50/PK)

## (undated) DEVICE — WATER IRRIG. STER 3000 ML - (4/CA)

## (undated) DEVICE — BAG URODRAIN WITH TUBING - (20/CA)

## (undated) DEVICE — KIT ANESTHESIA W/CIRCUIT & 3/LT BAG W/FILTER (20EA/CA)

## (undated) DEVICE — SLEEVE, VASO, THIGH, MED

## (undated) DEVICE — PACK SINGLE BASIN - (6/CA)

## (undated) DEVICE — PACK CYSTOSCOPY III - (8/CA)

## (undated) DEVICE — GOWN WARMING STANDARD FLEX - (30/CA)

## (undated) DEVICE — HEAD HOLDER JUNIOR/ADULT

## (undated) DEVICE — JELLY, KY 2 0Z STERILE

## (undated) DEVICE — GOWN SURGICAL X-LARGE ULTRA - FILM-REINFORCED (20/CA)

## (undated) DEVICE — LACTATED RINGERS INJ 1000 ML - (14EA/CA 60CA/PF)

## (undated) DEVICE — SENSOR SPO2 NEO LNCS ADHESIVE (20/BX) SEE USER NOTES

## (undated) DEVICE — CONNECTOR HOSE NEPTUNE FOR CYSTO ROOM

## (undated) DEVICE — SPONGE GAUZESTER 4 X 4 4PLY - (128PK/CA)